# Patient Record
Sex: FEMALE | Race: WHITE | Employment: OTHER | URBAN - METROPOLITAN AREA
[De-identification: names, ages, dates, MRNs, and addresses within clinical notes are randomized per-mention and may not be internally consistent; named-entity substitution may affect disease eponyms.]

---

## 2018-11-07 RX ORDER — ASPIRIN 81 MG/1
81 TABLET ORAL DAILY
COMMUNITY

## 2018-11-07 RX ORDER — ACETAMINOPHEN 500 MG
500 TABLET ORAL EVERY 6 HOURS PRN
COMMUNITY

## 2018-11-07 RX ORDER — POTASSIUM CHLORIDE 20 MEQ/1
20 TABLET, EXTENDED RELEASE ORAL 2 TIMES DAILY
COMMUNITY
End: 2021-01-18

## 2018-11-07 RX ORDER — ACYCLOVIR 800 MG/1
800 TABLET ORAL 2 TIMES DAILY
COMMUNITY

## 2018-11-07 RX ORDER — TRAMADOL HYDROCHLORIDE 50 MG/1
50 TABLET ORAL EVERY 6 HOURS PRN
COMMUNITY

## 2018-11-07 RX ORDER — FAMOTIDINE 40 MG/1
40 TABLET, FILM COATED ORAL DAILY
COMMUNITY
End: 2021-01-18

## 2018-11-07 RX ORDER — POLYETHYLENE GLYCOL 3350 17 G/17G
17 POWDER, FOR SOLUTION ORAL DAILY PRN
COMMUNITY

## 2018-11-07 RX ORDER — CYCLOBENZAPRINE HCL 5 MG
5 TABLET ORAL 3 TIMES DAILY PRN
COMMUNITY

## 2018-11-07 RX ORDER — LORAZEPAM 1 MG/1
1 TABLET ORAL EVERY 6 HOURS PRN
COMMUNITY

## 2018-11-07 RX ORDER — IRBESARTAN 75 MG/1
75 TABLET ORAL DAILY
COMMUNITY
End: 2021-01-18

## 2018-11-07 RX ORDER — FLUTICASONE PROPIONATE 50 MCG
1 SPRAY, SUSPENSION (ML) NASAL
COMMUNITY

## 2018-11-07 RX ORDER — CETIRIZINE HYDROCHLORIDE 10 MG/1
10 TABLET ORAL DAILY
COMMUNITY

## 2018-11-12 ENCOUNTER — ANESTHESIA EVENT (OUTPATIENT)
Dept: SURGERY | Facility: HOSPITAL | Age: 63
End: 2018-11-12

## 2018-11-12 ENCOUNTER — ANESTHESIA (OUTPATIENT)
Dept: SURGERY | Facility: HOSPITAL | Age: 63
End: 2018-11-12

## 2018-11-12 ENCOUNTER — HOSPITAL ENCOUNTER (OUTPATIENT)
Facility: HOSPITAL | Age: 63
Setting detail: HOSPITAL OUTPATIENT SURGERY
Discharge: HOME | End: 2018-11-12
Attending: OPHTHALMOLOGY | Admitting: OPHTHALMOLOGY
Payer: MEDICARE

## 2018-11-12 VITALS
OXYGEN SATURATION: 95 % | HEIGHT: 61 IN | RESPIRATION RATE: 15 BRPM | DIASTOLIC BLOOD PRESSURE: 67 MMHG | HEART RATE: 81 BPM | SYSTOLIC BLOOD PRESSURE: 118 MMHG | TEMPERATURE: 98 F | BODY MASS INDEX: 45.84 KG/M2 | WEIGHT: 242.81 LBS

## 2018-11-12 PROCEDURE — 08B43ZZ EXCISION OF RIGHT VITREOUS, PERCUTANEOUS APPROACH: ICD-10-PCS | Performed by: OPHTHALMOLOGY

## 2018-11-12 RX ORDER — HYDROCODONE BITARTRATE AND ACETAMINOPHEN 5; 325 MG/1; MG/1
2 TABLET ORAL AS NEEDED
Status: DISCONTINUED | OUTPATIENT
Start: 2018-11-12 | End: 2018-11-12

## 2018-11-12 RX ORDER — MORPHINE SULFATE 10 MG/ML
6 INJECTION, SOLUTION INTRAMUSCULAR; INTRAVENOUS EVERY 10 MIN PRN
Status: DISCONTINUED | OUTPATIENT
Start: 2018-11-12 | End: 2018-11-12

## 2018-11-12 RX ORDER — SODIUM CHLORIDE, SODIUM LACTATE, POTASSIUM CHLORIDE, CALCIUM CHLORIDE 600; 310; 30; 20 MG/100ML; MG/100ML; MG/100ML; MG/100ML
INJECTION, SOLUTION INTRAVENOUS CONTINUOUS
Status: DISCONTINUED | OUTPATIENT
Start: 2018-11-12 | End: 2018-11-12

## 2018-11-12 RX ORDER — METOCLOPRAMIDE 10 MG/1
10 TABLET ORAL ONCE
Status: COMPLETED | OUTPATIENT
Start: 2018-11-12 | End: 2018-11-12

## 2018-11-12 RX ORDER — NALOXONE HYDROCHLORIDE 0.4 MG/ML
80 INJECTION, SOLUTION INTRAMUSCULAR; INTRAVENOUS; SUBCUTANEOUS AS NEEDED
Status: DISCONTINUED | OUTPATIENT
Start: 2018-11-12 | End: 2018-11-12

## 2018-11-12 RX ORDER — TRIAMCINOLONE ACETONIDE 40 MG/ML
INJECTION, SUSPENSION INTRA-ARTICULAR; INTRAMUSCULAR AS NEEDED
Status: DISCONTINUED | OUTPATIENT
Start: 2018-11-12 | End: 2018-11-12 | Stop reason: HOSPADM

## 2018-11-12 RX ORDER — KETOROLAC TROMETHAMINE 5 MG/ML
1 SOLUTION OPHTHALMIC SEE ADMIN INSTRUCTIONS
Status: COMPLETED | OUTPATIENT
Start: 2018-11-12 | End: 2018-11-12

## 2018-11-12 RX ORDER — HOMATROPINE HYDROBROMIDE OPHTHALMIC 50 MG/ML
2 SOLUTION OPHTHALMIC SEE ADMIN INSTRUCTIONS
Status: COMPLETED | OUTPATIENT
Start: 2018-11-12 | End: 2018-11-12

## 2018-11-12 RX ORDER — KETAMINE HYDROCHLORIDE 50 MG/ML
INJECTION, SOLUTION, CONCENTRATE INTRAMUSCULAR; INTRAVENOUS AS NEEDED
Status: DISCONTINUED | OUTPATIENT
Start: 2018-11-12 | End: 2018-11-12 | Stop reason: SURG

## 2018-11-12 RX ORDER — MORPHINE SULFATE 4 MG/ML
2 INJECTION, SOLUTION INTRAMUSCULAR; INTRAVENOUS EVERY 10 MIN PRN
Status: DISCONTINUED | OUTPATIENT
Start: 2018-11-12 | End: 2018-11-12

## 2018-11-12 RX ORDER — FAMOTIDINE 20 MG/1
20 TABLET ORAL ONCE
Status: DISCONTINUED | OUTPATIENT
Start: 2018-11-12 | End: 2018-11-12 | Stop reason: HOSPADM

## 2018-11-12 RX ORDER — MIDAZOLAM HYDROCHLORIDE 1 MG/ML
INJECTION INTRAMUSCULAR; INTRAVENOUS AS NEEDED
Status: DISCONTINUED | OUTPATIENT
Start: 2018-11-12 | End: 2018-11-12 | Stop reason: SURG

## 2018-11-12 RX ORDER — BALANCED SALT SOLUTION 6.4; .75; .48; .3; 3.9; 1.7 MG/ML; MG/ML; MG/ML; MG/ML; MG/ML; MG/ML
SOLUTION OPHTHALMIC AS NEEDED
Status: DISCONTINUED | OUTPATIENT
Start: 2018-11-12 | End: 2018-11-12 | Stop reason: HOSPADM

## 2018-11-12 RX ORDER — MORPHINE SULFATE 4 MG/ML
4 INJECTION, SOLUTION INTRAMUSCULAR; INTRAVENOUS EVERY 10 MIN PRN
Status: DISCONTINUED | OUTPATIENT
Start: 2018-11-12 | End: 2018-11-12

## 2018-11-12 RX ORDER — TROPICAMIDE 10 MG/ML
2 SOLUTION/ DROPS OPHTHALMIC SEE ADMIN INSTRUCTIONS
Status: COMPLETED | OUTPATIENT
Start: 2018-11-12 | End: 2018-11-12

## 2018-11-12 RX ORDER — ONDANSETRON 2 MG/ML
4 INJECTION INTRAMUSCULAR; INTRAVENOUS ONCE AS NEEDED
Status: DISCONTINUED | OUTPATIENT
Start: 2018-11-12 | End: 2018-11-12

## 2018-11-12 RX ORDER — HYDROCODONE BITARTRATE AND ACETAMINOPHEN 5; 325 MG/1; MG/1
1 TABLET ORAL AS NEEDED
Status: DISCONTINUED | OUTPATIENT
Start: 2018-11-12 | End: 2018-11-12

## 2018-11-12 RX ORDER — CIPROFLOXACIN HYDROCHLORIDE 3.5 MG/ML
SOLUTION/ DROPS TOPICAL AS NEEDED
Status: DISCONTINUED | OUTPATIENT
Start: 2018-11-12 | End: 2018-11-12 | Stop reason: HOSPADM

## 2018-11-12 RX ORDER — DEXAMETHASONE SODIUM PHOSPHATE 4 MG/ML
VIAL (ML) INJECTION AS NEEDED
Status: DISCONTINUED | OUTPATIENT
Start: 2018-11-12 | End: 2018-11-12 | Stop reason: HOSPADM

## 2018-11-12 RX ORDER — PHENYLEPHRINE HCL 2.5 %
2 DROPS OPHTHALMIC (EYE) SEE ADMIN INSTRUCTIONS
Status: COMPLETED | OUTPATIENT
Start: 2018-11-12 | End: 2018-11-12

## 2018-11-12 RX ORDER — ACETAMINOPHEN 500 MG
1000 TABLET ORAL ONCE
Status: COMPLETED | OUTPATIENT
Start: 2018-11-12 | End: 2018-11-12

## 2018-11-12 RX ADMIN — KETAMINE HYDROCHLORIDE 20 MG: 50 INJECTION, SOLUTION, CONCENTRATE INTRAMUSCULAR; INTRAVENOUS at 08:46:00

## 2018-11-12 RX ADMIN — SODIUM CHLORIDE, SODIUM LACTATE, POTASSIUM CHLORIDE, CALCIUM CHLORIDE: 600; 310; 30; 20 INJECTION, SOLUTION INTRAVENOUS at 09:07:00

## 2018-11-12 RX ADMIN — MIDAZOLAM HYDROCHLORIDE 2 MG: 1 INJECTION INTRAMUSCULAR; INTRAVENOUS at 08:46:00

## 2018-11-12 RX ADMIN — SODIUM CHLORIDE, SODIUM LACTATE, POTASSIUM CHLORIDE, CALCIUM CHLORIDE: 600; 310; 30; 20 INJECTION, SOLUTION INTRAVENOUS at 08:42:00

## 2018-11-12 NOTE — ANESTHESIA PREPROCEDURE EVALUATION
Anesthesia PreOp Note    HPI:     Dietrich Boeck is a 66year old female who presents for preoperative consultation requested by:  Willard Morales MD    Date of Surgery: 11/12/2018    Procedure(s):  EYE VITRECTOMY WITH INDIRECT/ DIRECT LASER/ IRIDEX  Ind 11/9/2018 at Unknown time   Pomalidomide (POMALYST) 3 MG Oral Cap Take 1 tablet by mouth daily. Disp:  Rfl:  11/7/2018   sodium chloride 0.9 % SOLN 20 mL with Dexamethasone Sodium Phosphate 120 MG/30ML SOLN 12 mg by IV Push route once a week.  Disp:  Rfl: needs - non-medical: Not on file    Occupational History      Not on file    Tobacco Use      Smoking status: Never Smoker      Smokeless tobacco: Never Used    Substance and Sexual Activity      Alcohol use: Yes        Comment: RARE      Drug use:  No desires the anesthetic management as planned.   JAYLON PETERSON  11/12/2018 8:33 AM

## 2018-11-12 NOTE — ANESTHESIA POSTPROCEDURE EVALUATION
Patient: Vonnie Pellet    Procedure Summary     Date:  11/12/18 Room / Location:  [address] [place] [address] MAIN OR 03 / [address] [place] [address] MAIN OR    Anesthesia Start:  5780 Anesthesia Stop:      Procedure:  EYE VITRECTOMY WITH INDIRECT/ DIRECT LASER/ IRIDEX (Right Eye) Diagnosis:  (v

## 2018-11-12 NOTE — OPERATIVE REPORT
[facility]    PATIENT'S NAME: Jordan Winston   ATTENDING PHYSICIAN: Jackie Velez MD   OPERATING PHYSICIAN: Jackie Velez MD   PATIENT ACCOUNT#:   [de-identified]    LOCATION:  [facility] PACU [address] [place] [address]  MEDICAL RECORD #:   [id number]       DATE OF BI the infusion line. Subconjunctival ciprofloxacin and dexamethasone were then administered. Speculum and drapes were removed. Operative area was cleaned and dressed with a patch.   Patient was transferred to the PACU in stable condition having tolerated t

## 2018-11-12 NOTE — H&P
History & Physical Examination    Name: Daisha Mars  Patient ID:  [id number]  Date:  11/12/2018  YOB: 1955 AGE:  66year old SEX:  female      M.D./D.O./D.P.M PERFORMING SURGERY/PROCEDURE:    Eliel Kaiser MD    Diagnosis:  Vitreous

## 2018-11-12 NOTE — BRIEF OP NOTE
Pre-Operative Diagnosis: vitreous membranes and strands right eye     Post-Operative Diagnosis:same     Procedure Performed:     pars plana vitrectomy right eye    Surgeon(s) and Role:     Tim Burrell MD - Primary    Anesthesia : RBB     Complications:

## 2020-12-14 ENCOUNTER — OFFICE VISIT (OUTPATIENT)
Dept: PODIATRY CLINIC | Facility: CLINIC | Age: 65
End: 2020-12-14
Payer: MEDICARE

## 2020-12-14 DIAGNOSIS — M72.2 PLANTAR FASCIITIS: Primary | ICD-10-CM

## 2020-12-14 DIAGNOSIS — M19.071 PRIMARY OSTEOARTHRITIS OF BOTH FEET: ICD-10-CM

## 2020-12-14 DIAGNOSIS — M19.072 PRIMARY OSTEOARTHRITIS OF BOTH FEET: ICD-10-CM

## 2020-12-14 PROCEDURE — 99203 OFFICE O/P NEW LOW 30 MIN: CPT | Performed by: PODIATRIST

## 2020-12-14 PROCEDURE — L3000 FT INSERT UCB BERKELEY SHELL: HCPCS | Performed by: PODIATRIST

## 2020-12-14 PROCEDURE — 29799 UNLISTED PX CASTING/STRPG: CPT | Performed by: PODIATRIST

## 2020-12-14 NOTE — PROGRESS NOTES
Markell Fernandez is a 63year old female. Patient presents with:  New Patient: Patient has had orthotics since she was 27years old - current orthotics are not providing enough support - ankle pain and knee instability.         HPI:     Is here for evalu ([facility] [address]     MULTIPLE MYELOMA   • Cataract    • High blood pressure    • IBS (irritable bowel syndrome)    • Osteoarthritis    • Sleep apnea     USES CPAP   • Visual impairment     WEARS GLASSES      Past Surgical History:   Procedure Laterality Date   • CHOL normal.  She is got significant collapse of the foot with tenderness upon palpation along the plantar fascia she is got midfoot arthritis noted secondary to spurring.         ASSESSMENT AND PLAN:   Diagnoses and all orders for this visit:    Plantar fasciit

## 2021-01-13 ENCOUNTER — TELEPHONE (OUTPATIENT)
Dept: PODIATRY CLINIC | Facility: CLINIC | Age: 66
End: 2021-01-13

## 2021-01-18 ENCOUNTER — OFFICE VISIT (OUTPATIENT)
Dept: PODIATRY CLINIC | Facility: CLINIC | Age: 66
End: 2021-01-18
Payer: MEDICARE

## 2021-01-18 DIAGNOSIS — M72.2 PLANTAR FASCIITIS: Primary | ICD-10-CM

## 2021-01-18 DIAGNOSIS — M19.071 PRIMARY OSTEOARTHRITIS OF BOTH FEET: ICD-10-CM

## 2021-01-18 DIAGNOSIS — M19.072 PRIMARY OSTEOARTHRITIS OF BOTH FEET: ICD-10-CM

## 2021-01-18 RX ORDER — POTASSIUM CHLORIDE 1500 MG/1
TABLET, FILM COATED, EXTENDED RELEASE ORAL
COMMUNITY
Start: 2021-01-10

## 2021-01-18 RX ORDER — OMEPRAZOLE 20 MG/1
CAPSULE, DELAYED RELEASE ORAL
COMMUNITY
Start: 2020-09-22 | End: 2021-01-18

## 2021-01-18 RX ORDER — METFORMIN HYDROCHLORIDE 500 MG/1
500 TABLET, EXTENDED RELEASE ORAL
COMMUNITY
Start: 2020-08-26 | End: 2021-03-11

## 2021-01-18 RX ORDER — HYDROCORTISONE 25 MG/ML
LOTION TOPICAL
COMMUNITY
Start: 2018-06-27

## 2021-01-18 RX ORDER — BLOOD SUGAR DIAGNOSTIC
STRIP MISCELLANEOUS DAILY
COMMUNITY
Start: 2018-06-11

## 2021-01-18 RX ORDER — ZOLEDRONIC ACID 4 MG
4 KIT INTRAVENOUS
COMMUNITY
Start: 2019-01-09

## 2021-01-18 RX ORDER — MAGNESIUM OXIDE 400 MG (241.3 MG MAGNESIUM) TABLET
400 TABLET DAILY
COMMUNITY
Start: 2020-05-22 | End: 2021-01-18

## 2021-01-18 RX ORDER — DIPHENHYD/PHENYLEPH/ACETAMINOP 12.5-5-325
TABLET ORAL
COMMUNITY
Start: 2019-02-01

## 2021-01-18 NOTE — PROGRESS NOTES
Rio Bolanos is a 63year old female. Patient presents with:  Orthotic Status: here to  orthotics.         HPI:     She is here for dispensing of orthotics    Allergies: Pravastatin, Dapsone, Mastisol Adhesive, Atovaquone, and Bactrim [Sulfam • Back problem    • Cancer (HCC)     MULTIPLE MYELOMA   • Cataract    • High blood pressure    • IBS (irritable bowel syndrome)    • Osteoarthritis    • Sleep apnea     USES CPAP   • Visual impairment     WEARS GLASSES      Past Surgical History:   Proce Normal sharp dull sensation; reflexes normal.  Orthotics appear to fit properly        ASSESSMENT AND PLAN:   Diagnoses and all orders for this visit:    Plantar fasciitis    Primary osteoarthritis of both feet        Plan:  We watched her walk with the ort

## 2021-01-21 ENCOUNTER — OFFICE VISIT (OUTPATIENT)
Dept: PODIATRY CLINIC | Facility: CLINIC | Age: 66
End: 2021-01-21
Payer: MEDICARE

## 2021-01-21 DIAGNOSIS — M19.071 PRIMARY OSTEOARTHRITIS OF BOTH FEET: Primary | ICD-10-CM

## 2021-01-21 DIAGNOSIS — M19.072 PRIMARY OSTEOARTHRITIS OF BOTH FEET: Primary | ICD-10-CM

## 2021-01-21 DIAGNOSIS — M72.2 PLANTAR FASCIITIS: ICD-10-CM

## 2021-01-21 PROCEDURE — 99213 OFFICE O/P EST LOW 20 MIN: CPT | Performed by: PODIATRIST

## 2021-01-21 NOTE — PROGRESS NOTES
Ariadne Dougherty is a 63year old female. Patient presents with: Follow - Up: Orthotics not fitting right.         HPI:     Patient presents today states that she is getting some soreness in the foot on the top of the foot right side after wearing the EC Take 81 mg by mouth daily. • Cyclobenzaprine HCl 5 MG Oral Tab Take 5 mg by mouth 3 (three) times daily as needed for Muscle spasms. • MAGNESIUM OXIDE OR Take by mouth.         Past Medical History:   Diagnosis Date   • Back problem    • Cancer ( Vascular: Dorsalis pedis two out of four bilateral and posterior tibial pulses two out of   four bilateral, capillary refill normal.   3. Musculoskeletal: All muscle groups are graded 5 out of 5 in the foot and ankle.    4. Neurological: Normal sharp dull s

## 2021-03-11 ENCOUNTER — OFFICE VISIT (OUTPATIENT)
Dept: PODIATRY CLINIC | Facility: CLINIC | Age: 66
End: 2021-03-11
Payer: MEDICARE

## 2021-03-11 DIAGNOSIS — L84 CALLUS OF FOOT: Primary | ICD-10-CM

## 2021-03-14 NOTE — PROGRESS NOTES
Cristina Deleon is a 63year old female. Patient presents with: Follow - Up: did not check BS this AM - per patient develop callus on right foot due to new orthotics - pain scale at worst 8/10.         HPI:   Turns to clinic complaining of painful ort MG Oral Tab Take 5 mg by mouth 3 (three) times daily as needed for Muscle spasms. • MAGNESIUM OXIDE OR Take by mouth.         Past Medical History:   Diagnosis Date   • Back problem    • Cancer ([address]     MULTIPLE MYELOMA   • Cataract    • High blood pre Services:       Active Member of Clubs or Organizations:       Attends Club or Organization Meetings:       Marital Status:   Intimate Partner Violence:       Fear of Current or Ex-Partner:       Emotionally Abused:       Physically Abused:       Sexually

## 2021-03-24 ENCOUNTER — TELEPHONE (OUTPATIENT)
Dept: PODIATRY CLINIC | Facility: CLINIC | Age: 66
End: 2021-03-24

## 2021-03-24 NOTE — TELEPHONE ENCOUNTER
Please inform the patient that there would be an upper charge for that she may not want them thank you

## 2021-03-24 NOTE — TELEPHONE ENCOUNTER
Called [facility] -- spoke to Neil. He states that Christopher wanted orthotics to have navicular accomodation. This would mean that they would need to create a new orthotic. They can do this at a 35 % discount which would cost $109.59 without shipping and with shipping around $125.00. Informed Neil that I will let Dr. Martín Francisco know of this and we will need to discuss with pt as well and then call [facility] know of their decision. Neil to make a note of this stating they are waiting on our offices response. -- Dr Martín Francisco please advise -- Do you still want to proceed with adjusting these orthotics?    -- Tasking to Cass

## 2021-03-25 NOTE — TELEPHONE ENCOUNTER
Spoke to patient. She is very upset that there is an extra fee and refuses to pay as she states the origianl orthotics were the wrong fit. I told patient I will need to speak to someone in my department about the situation with the original orthotic and the extra fee for a new one as I did not have background information on this. Patient was able to come down towards the end of the conversation and was agreeable to me returning her call.

## 2021-04-01 NOTE — TELEPHONE ENCOUNTER
[facility] called again reporting navicular accommodation remains at 35% discount and asking how to proceed. Advised them to put the order on hold as the patient does not want to pay additional fees for orthotics. Routed to  staff per documentation below.

## 2021-04-06 NOTE — TELEPHONE ENCOUNTER
Patient calling requesting her used orthotic is returned. Still waiting on approval for extra charge wave. I will contact the patient as soon as I get confirmation. I also contacted [facility] explained that patient wants her old orthotic back and I will call them as soon as I hear on charges so they can build the new orthotic or return the used.

## 2021-04-14 NOTE — TELEPHONE ENCOUNTER
I received an authorization to make new orthotics at 35% discount at no charge to the patient. Clinic will the charge for the new orthotics. I called the patient and I notified her of this and she was happy to have new orthotics made as she had previously requested the old ones back. I will call patient when the new orthotics come in. She also has new X-Rays of her ankle so I advised that she make an appt with Dr Guanako Douglass later. I spoke to [facility] at [facility]. They will start work on the new orthotics.

## 2021-04-15 NOTE — TELEPHONE ENCOUNTER
I spoke to [facility] at [facility] this morning. The clinic will be covering the cost for the new orthotics at 35% discount.  I asked Jennifer to bill the clinic and she explained she will contact me once the order is created to give me a total.

## 2021-05-07 ENCOUNTER — OFFICE VISIT (OUTPATIENT)
Dept: PODIATRY CLINIC | Facility: CLINIC | Age: 66
End: 2021-05-07
Payer: MEDICARE

## 2021-05-07 DIAGNOSIS — M19.072 PRIMARY OSTEOARTHRITIS OF BOTH FEET: ICD-10-CM

## 2021-05-07 DIAGNOSIS — L84 CALLUS OF FOOT: ICD-10-CM

## 2021-05-07 DIAGNOSIS — M19.071 PRIMARY OSTEOARTHRITIS OF BOTH FEET: ICD-10-CM

## 2021-05-07 DIAGNOSIS — M72.2 PLANTAR FASCIITIS: Primary | ICD-10-CM

## 2021-05-11 NOTE — PROGRESS NOTES
Chelle Palumbo is a 63year old female. Patient presents with:  Orthotic Status: Patient is here to  her orthotics. Patient does see another podiatrist, she was diagnosed with a stress fracture on the right foot.  Patient is wearing a surgical route once a week. • aspirin 81 MG Oral Tab EC Take 81 mg by mouth daily. • Cyclobenzaprine HCl 5 MG Oral Tab Take 5 mg by mouth 3 (three) times daily as needed for Muscle spasms. • MAGNESIUM OXIDE OR Take by mouth.         Past Medical History: foot    Primary osteoarthritis of both feet        Plan: Patient will wear as tolerated and break-in gradually follow-up as needed or if they bother her. The patient indicates understanding of these issues and agrees to the plan.     VAISHNAVI Luis

## 2024-06-18 ENCOUNTER — TELEPHONE (OUTPATIENT)
Dept: NEUROLOGY | Facility: CLINIC | Age: 69
End: 2024-06-18

## 2024-06-18 NOTE — TELEPHONE ENCOUNTER
EMG report from [facility] placed in Dr Lerma's folder for review. Report is also in Care Everywhere. Patient has seen Dr Lerma at [facility] but [facility] neurology. Dr Lerma reviewed and reprort discarded.

## 2024-12-30 ENCOUNTER — HOSPITAL ENCOUNTER (INPATIENT)
Facility: HOSPITAL | Age: 69
LOS: 7 days | Discharge: NURSING, REHAB OR LONG-TERM CARE | DRG: 481 | End: 2025-01-06
Attending: EMERGENCY MEDICINE | Admitting: HOSPITALIST
Payer: MEDICARE

## 2024-12-30 ENCOUNTER — APPOINTMENT (OUTPATIENT)
Dept: GENERAL RADIOLOGY | Facility: HOSPITAL | Age: 69
DRG: 481 | End: 2024-12-30
Attending: EMERGENCY MEDICINE
Payer: MEDICARE

## 2024-12-30 DIAGNOSIS — S72.8X2A OTHER CLOSED FRACTURE OF LEFT FEMUR, UNSPECIFIED PORTION OF FEMUR, INITIAL ENCOUNTER (HCC): Primary | ICD-10-CM

## 2024-12-30 PROBLEM — S72.009A: Status: ACTIVE | Noted: 2024-12-30

## 2024-12-30 LAB
ANION GAP SERPL CALC-SCNC: 10 MMOL/L (ref 0–18)
ANTIBODY SCREEN: POSITIVE
BASOPHILS # BLD AUTO: 0.05 X10(3) UL (ref 0–0.2)
BASOPHILS NFR BLD AUTO: 0.9 %
BUN BLD-MCNC: 12 MG/DL (ref 9–23)
BUN/CREAT SERPL: 15.8 (ref 10–20)
CALCIUM BLD-MCNC: 9.8 MG/DL (ref 8.7–10.4)
CHLORIDE SERPL-SCNC: 106 MMOL/L (ref 98–112)
CO2 SERPL-SCNC: 24 MMOL/L (ref 21–32)
CREAT BLD-MCNC: 0.76 MG/DL
DEPRECATED RDW RBC AUTO: 44 FL (ref 35.1–46.3)
DIRECT COOMBS POLY: NEGATIVE
EGFRCR SERPLBLD CKD-EPI 2021: 86 ML/MIN/1.73M2 (ref 60–?)
EOSINOPHIL # BLD AUTO: 0.12 X10(3) UL (ref 0–0.7)
EOSINOPHIL NFR BLD AUTO: 2.2 %
ERYTHROCYTE [DISTWIDTH] IN BLOOD BY AUTOMATED COUNT: 13.5 % (ref 11–15)
GLUCOSE BLD-MCNC: 119 MG/DL (ref 70–99)
HCT VFR BLD AUTO: 43.2 %
HGB BLD-MCNC: 14.7 G/DL
IMM GRANULOCYTES # BLD AUTO: 0.03 X10(3) UL (ref 0–1)
IMM GRANULOCYTES NFR BLD: 0.5 %
K ANTIGEN: NEGATIVE
LYMPHOCYTES # BLD AUTO: 2.69 X10(3) UL (ref 1–4)
LYMPHOCYTES NFR BLD AUTO: 48.5 %
MCH RBC QN AUTO: 30.4 PG (ref 26–34)
MCHC RBC AUTO-ENTMCNC: 34 G/DL (ref 31–37)
MCV RBC AUTO: 89.4 FL
MONOCYTES # BLD AUTO: 0.4 X10(3) UL (ref 0.1–1)
MONOCYTES NFR BLD AUTO: 7.2 %
MRSA DNA SPEC QL NAA+PROBE: NEGATIVE
NEUTROPHILS # BLD AUTO: 2.26 X10 (3) UL (ref 1.5–7.7)
NEUTROPHILS # BLD AUTO: 2.26 X10(3) UL (ref 1.5–7.7)
NEUTROPHILS NFR BLD AUTO: 40.7 %
OSMOLALITY SERPL CALC.SUM OF ELEC: 291 MOSM/KG (ref 275–295)
PLATELET # BLD AUTO: 149 10(3)UL (ref 150–450)
POTASSIUM SERPL-SCNC: 3.6 MMOL/L (ref 3.5–5.1)
RBC # BLD AUTO: 4.83 X10(6)UL
RH BLOOD TYPE: POSITIVE
RH BLOOD TYPE: POSITIVE
SODIUM SERPL-SCNC: 140 MMOL/L (ref 136–145)
WBC # BLD AUTO: 5.6 X10(3) UL (ref 4–11)

## 2024-12-30 PROCEDURE — 73502 X-RAY EXAM HIP UNI 2-3 VIEWS: CPT | Performed by: EMERGENCY MEDICINE

## 2024-12-30 PROCEDURE — 99223 1ST HOSP IP/OBS HIGH 75: CPT | Performed by: HOSPITALIST

## 2024-12-30 RX ORDER — METOCLOPRAMIDE HYDROCHLORIDE 5 MG/ML
5 INJECTION INTRAMUSCULAR; INTRAVENOUS EVERY 8 HOURS PRN
Status: DISCONTINUED | OUTPATIENT
Start: 2024-12-30 | End: 2024-12-31

## 2024-12-30 RX ORDER — MORPHINE SULFATE 2 MG/ML
1 INJECTION, SOLUTION INTRAMUSCULAR; INTRAVENOUS EVERY 2 HOUR PRN
Status: DISCONTINUED | OUTPATIENT
Start: 2024-12-30 | End: 2024-12-31

## 2024-12-30 RX ORDER — HYDRALAZINE HYDROCHLORIDE 20 MG/ML
10 INJECTION INTRAMUSCULAR; INTRAVENOUS EVERY 4 HOURS PRN
Status: DISCONTINUED | OUTPATIENT
Start: 2024-12-30 | End: 2025-01-06

## 2024-12-30 RX ORDER — MORPHINE SULFATE 2 MG/ML
2 INJECTION, SOLUTION INTRAMUSCULAR; INTRAVENOUS EVERY 2 HOUR PRN
Status: DISCONTINUED | OUTPATIENT
Start: 2024-12-30 | End: 2024-12-31

## 2024-12-30 RX ORDER — CALCIUM ACETATE 667 MG/1
1 CAPSULE ORAL 2 TIMES DAILY
COMMUNITY

## 2024-12-30 RX ORDER — ONDANSETRON 2 MG/ML
4 INJECTION INTRAMUSCULAR; INTRAVENOUS EVERY 6 HOURS PRN
Status: DISCONTINUED | OUTPATIENT
Start: 2024-12-30 | End: 2024-12-30

## 2024-12-30 RX ORDER — ONDANSETRON 2 MG/ML
4 INJECTION INTRAMUSCULAR; INTRAVENOUS EVERY 4 HOURS PRN
Status: DISCONTINUED | OUTPATIENT
Start: 2024-12-30 | End: 2024-12-31

## 2024-12-30 RX ORDER — MORPHINE SULFATE 4 MG/ML
8 INJECTION, SOLUTION INTRAMUSCULAR; INTRAVENOUS ONCE
Status: COMPLETED | OUTPATIENT
Start: 2024-12-30 | End: 2024-12-30

## 2024-12-30 RX ORDER — CYCLOBENZAPRINE HCL 5 MG
5 TABLET ORAL 3 TIMES DAILY PRN
Status: DISCONTINUED | OUTPATIENT
Start: 2024-12-30 | End: 2025-01-06

## 2024-12-30 RX ORDER — CEFAZOLIN SODIUM IN 0.9 % NACL 3 G/100 ML
3 INTRAVENOUS SOLUTION, PIGGYBACK (ML) INTRAVENOUS
Status: COMPLETED | OUTPATIENT
Start: 2024-12-31 | End: 2024-12-31

## 2024-12-30 RX ORDER — ENOXAPARIN SODIUM 100 MG/ML
40 INJECTION SUBCUTANEOUS ONCE
Status: COMPLETED | OUTPATIENT
Start: 2024-12-30 | End: 2024-12-30

## 2024-12-30 RX ORDER — MORPHINE SULFATE 4 MG/ML
4 INJECTION, SOLUTION INTRAMUSCULAR; INTRAVENOUS ONCE
Status: COMPLETED | OUTPATIENT
Start: 2024-12-30 | End: 2024-12-30

## 2024-12-30 RX ORDER — ACETAMINOPHEN 500 MG
500 TABLET ORAL EVERY 4 HOURS PRN
Status: DISCONTINUED | OUTPATIENT
Start: 2024-12-30 | End: 2025-01-06

## 2024-12-30 RX ORDER — ASPIRIN 81 MG/1
81 TABLET ORAL DAILY
Status: CANCELLED | OUTPATIENT
Start: 2024-12-30

## 2024-12-30 RX ORDER — CETIRIZINE HYDROCHLORIDE 10 MG/1
10 TABLET ORAL DAILY
Status: DISCONTINUED | OUTPATIENT
Start: 2024-12-30 | End: 2025-01-06

## 2024-12-30 RX ORDER — TEMAZEPAM 15 MG/1
15 CAPSULE ORAL NIGHTLY PRN
Status: DISCONTINUED | OUTPATIENT
Start: 2024-12-30 | End: 2025-01-06

## 2024-12-30 RX ORDER — MORPHINE SULFATE 4 MG/ML
INJECTION, SOLUTION INTRAMUSCULAR; INTRAVENOUS
Status: COMPLETED
Start: 2024-12-30 | End: 2024-12-30

## 2024-12-30 RX ORDER — CALCIUM ACETATE 667 MG/1
1 CAPSULE ORAL 2 TIMES DAILY
Status: DISCONTINUED | OUTPATIENT
Start: 2024-12-30 | End: 2025-01-06

## 2024-12-30 RX ORDER — GABAPENTIN 300 MG/1
1 CAPSULE ORAL EVERY EVENING
COMMUNITY
Start: 2024-12-04

## 2024-12-30 RX ORDER — MORPHINE SULFATE 4 MG/ML
4 INJECTION, SOLUTION INTRAMUSCULAR; INTRAVENOUS EVERY 2 HOUR PRN
Status: DISCONTINUED | OUTPATIENT
Start: 2024-12-30 | End: 2024-12-31

## 2024-12-30 NOTE — H&P
[facility]    PATIENT'S NAME: GERI AGUIRRE   ATTENDING PHYSICIAN: Jose J Bailey MD   PATIENT ACCOUNT#:   [id number]    LOCATION:  [address]  MEDICAL RECORD #:   [id number]       YOB: 1955  ADMISSION DATE:       12/30/2024    HISTORY AND PHYSICAL EXAMINATION    CHIEF COMPLAINT:  Left comminuted displaced proximal femur fracture.    HISTORY OF PRESENT ILLNESS:  Patient is a 69-year-old  female who tripped on a carpet and fell, landing on her left hip, with excruciating pain.  Brought into the emergency department for evaluation.  CBC and chemistry were unremarkable.  X-ray of the hip showed acute foreshortened and displaced fracture involving the proximal left femur shaft.  EKG still pending.  Patient will be admitted to the hospital for further management.     PAST MEDICAL HISTORY:  Essential hypertension, morbid obesity, obstructive sleep apnea, generalized osteoarthritis degenerative joint disease of lumbar spine, irritable bowel syndrome.  Multiple myeloma, status post autologous stem cell transplant, currently on monthly injections of daratumumab.      PAST SURGICAL HISTORY:  Cholecystectomy, cataract procedure, hysterectomy, right total knee arthroplasty, bladder lift procedure, lumbar laminectomy, and tonsillectomy.    MEDICATIONS:  Please see medication reconciliation list.     ALLERGIES:  Bactrim.  Side effects to statins.    FAMILY HISTORY:  Mother had COPD.  Father had heart disease.      SOCIAL HISTORY:  No tobacco, alcohol, or drug use.  Independent for basic activities of daily living.     REVIEW OF SYSTEMS:  Patient described the fall as mechanical, landing with full force and her entire weight on her left hip.  She heard a pop and she could not bear weight.  Denies any chest pain or shortness of breath.  No dizziness.  No syncope.  Other 12-point review of systems is negative.       PHYSICAL EXAMINATION:    GENERAL:  Alert and oriented to time,  place and person.  Moderate distress.   VITAL SIGNS:  Temperature 97.6, pulse 58, respiratory rate 15, blood pressure 170/74, pulse ox 92% on room air.   HEENT:  Atraumatic.  Oropharynx clear.  Moist mucous membranes.  Ears and nose normal.  Eyes:  Anicteric sclerae.   NECK:  Supple.  No lymphadenopathy.  Trachea midline.  Full range of motion.   LUNGS:  Clear to auscultation bilaterally.  Normal respiratory effort.    HEART:  Regular rate and rhythm.  S1 and S2 auscultated.  No murmur.    ABDOMEN:  Soft, obese.  Positive bowel sounds.   EXTREMITIES:  Left lower extremity shortened and externally rotated compared to the right.  Dorsalis pedis pulses palpated bilaterally.  NEUROLOGIC:  Motor and sensory intact.    ASSESSMENT:    1.   Acute displaced left proximal femur shaft fracture after a mechanical fall.  2.   Morbid obesity.  3.   Obstructive sleep apnea.  4.   Essential hypertension.  5.   Multiple myeloma, status post autologous stem cell transplant, currently in remission.     PLAN:  Patient will be admitted to general medical floor.  Pain control.  DVT prophylaxis.  Monitor hemodynamic status.  High risk for fat embolism.  Obtain orthopedic surgery consult.  Obtain 12-lead EKG.  If unremarkable, patient is cleared for surgical intervention.  Further recommendations to follow.     Dictated By Yoana Pierson MD  d: 12/30/2024 12:16:32  t: 12/30/2024 12:29:18  [facility] [id number]  FB/

## 2024-12-30 NOTE — CONSULTS
[facility]  Report of Consultation    Jackelin Mackey Patient Status:  Inpatient    1955 MRN [id number]   Location [facility] 4W/SW/SE Attending Yoana Pierson MD   Hosp Day # 0 PCP Colby Pandya MD, MD     Reason for Consultation:  Left femur fracture    History of Present Illness:  Jackelin Mackey is a a(n) 69 year old female s/p fall with left subtroch femur fracture.      History:  Past Medical History:    Back problem    Cancer (HCC)    MULTIPLE MYELOMA    Cataract    High blood pressure    IBS (irritable bowel syndrome)    Osteoarthritis    Sleep apnea    USES CPAP    Visual impairment    WEARS GLASSES     Past Surgical History:   Procedure Laterality Date    Cholecystectomy      Hysterectomy      Other      BLADDER LIFT    Other      LAMINECTOMY,SYNOVIAL CYST REMOVAL    Other  ,    STEM CELL TRANSPLANT    Tonsillectomy       Family History   Problem Relation Age of Onset    Heart Disease Father     Other (COPD) Mother     Diabetes Sister       reports that she has never smoked. She has never used smokeless tobacco. She reports current alcohol use. She reports that she does not use drugs.    Allergies:  Allergies[1]    Medications:    Current Facility-Administered Medications:     acetaminophen (Tylenol Extra Strength) tab 500 mg, 500 mg, Oral, Q4H PRN    ondansetron (Zofran) 4 MG/2ML injection 4 mg, 4 mg, Intravenous, Q6H PRN    metoclopramide (Reglan) 5 mg/mL injection 5 mg, 5 mg, Intravenous, Q8H PRN    temazepam (Restoril) cap 15 mg, 15 mg, Oral, Nightly PRN    morphINE PF 2 MG/ML injection 1 mg, 1 mg, Intravenous, Q2H PRN **OR** morphINE PF 2 MG/ML injection 2 mg, 2 mg, Intravenous, Q2H PRN **OR** morphINE PF 4 MG/ML injection 4 mg, 4 mg, Intravenous, Q2H PRN    calcium acetate (Phoslo) cap 667 mg, 1 capsule, Oral, BID    cetirizine (ZyrTEC) tab 10 mg, 10 mg, Oral, Daily    cyclobenzaprine (Flexeril) tab 5 mg, 5 mg, Oral, TID PRN    hydrALAzine  (Apresoline) 20 mg/mL injection 10 mg, 10 mg, Intravenous, Q4H PRN    Review of Systems: Multiple myeloma, SLOAN, obesity, HTN    Physical Exam:    General: Alert, orientated x3.  Cooperative.  No apparent distress.  Vital Signs:  Blood pressure (!) 183/91, pulse 86, temperature 97.6 °F (36.4 °C), temperature source Oral, resp. rate 18, height 5' (1.524 m), weight 233 lb (105.7 kg), SpO2 98%.    Extremities:  LLE externally rotated.  +hip TTP, no ecchymosis at this time. Neuro intact LLE.  No calf tenderness/swelling     Laboratory Data:  Lab Results   Component Value Date    WBC 5.6 12/30/2024    HGB 14.7 12/30/2024    HCT 43.2 12/30/2024    .0 12/30/2024    CREATSERUM 0.76 12/30/2024    BUN 12 12/30/2024     12/30/2024    K Negative 12/30/2024    K 3.6 12/30/2024     12/30/2024    CO2 24.0 12/30/2024     12/30/2024    CA 9.8 12/30/2024       Impression and Plan:  Patient Active Problem List   Diagnosis    Other closed fracture of left femur, unspecified portion of femur, initial encounter ([facility])    Closed fracture of proximal end of femur (HCC)       Left hip subtroch femur fracture    Will proceed with IMN left femur Tue  NPO after midnight  Ancef on call to OR  Discussed risks/benefits at length. Infection, nonuion, malunion, neurovasc injury, anesthetic risk, possible need for further surgery, DVT, PE.          Santo Dallas MD  12/30/2024  5:59 PM         [1]   Allergies  Allergen Reactions    Pravastatin MYALGIA    Dapsone OTHER (SEE COMMENTS)     Lowers hemoglobin    Mastisol Adhesive OTHER (SEE COMMENTS)     Weakens the skin    Atovaquone DIARRHEA    Bactrim [Sulfamethoxazole W/Trimethoprim] RASH

## 2024-12-30 NOTE — ED INITIAL ASSESSMENT (HPI)
Via ems from [facility] [employer] for mechanical trip and fall d/t carpet onto left hip. + CMS, PVS intact  150mcg fentanyl IV and 4mg IV zofran given by EMS. + daily baby aspirin. Denies head injury/LOC.

## 2024-12-30 NOTE — ED QUICK NOTES
Orders for admission, patient is aware of plan and ready to go upstairs. Any questions, please call ED RN ricardo at extension [phone].     Patient Covid vaccination status: Fully vaccinated     COVID Test Ordered in ED: None    COVID Suspicion at Admission: N/A    Running Infusions:  None    Mental Status/LOC at time of transport: x4    Other pertinent information:   CIWA score: N/A   NIH score:  N/A

## 2024-12-31 ENCOUNTER — APPOINTMENT (OUTPATIENT)
Dept: GENERAL RADIOLOGY | Facility: HOSPITAL | Age: 69
DRG: 481 | End: 2024-12-31
Attending: HOSPITALIST
Payer: MEDICARE

## 2024-12-31 ENCOUNTER — ANESTHESIA (OUTPATIENT)
Dept: SURGERY | Facility: HOSPITAL | Age: 69
DRG: 481 | End: 2024-12-31
Payer: MEDICARE

## 2024-12-31 ENCOUNTER — APPOINTMENT (OUTPATIENT)
Dept: GENERAL RADIOLOGY | Facility: HOSPITAL | Age: 69
DRG: 481 | End: 2024-12-31
Attending: PHYSICIAN ASSISTANT
Payer: MEDICARE

## 2024-12-31 ENCOUNTER — APPOINTMENT (OUTPATIENT)
Dept: GENERAL RADIOLOGY | Facility: HOSPITAL | Age: 69
DRG: 481 | End: 2024-12-31
Attending: ORTHOPAEDIC SURGERY
Payer: MEDICARE

## 2024-12-31 ENCOUNTER — ANESTHESIA EVENT (OUTPATIENT)
Dept: SURGERY | Facility: HOSPITAL | Age: 69
DRG: 481 | End: 2024-12-31
Payer: MEDICARE

## 2024-12-31 LAB
ANION GAP SERPL CALC-SCNC: 8 MMOL/L (ref 0–18)
ATRIAL RATE: 69 BPM
BASOPHILS # BLD AUTO: 0.04 X10(3) UL (ref 0–0.2)
BASOPHILS NFR BLD AUTO: 0.7 %
BUN BLD-MCNC: 11 MG/DL (ref 9–23)
BUN/CREAT SERPL: 13.9 (ref 10–20)
CALCIUM BLD-MCNC: 9.1 MG/DL (ref 8.7–10.4)
CHLORIDE SERPL-SCNC: 104 MMOL/L (ref 98–112)
CO2 SERPL-SCNC: 27 MMOL/L (ref 21–32)
CREAT BLD-MCNC: 0.79 MG/DL
DEPRECATED RDW RBC AUTO: 45.4 FL (ref 35.1–46.3)
EGFRCR SERPLBLD CKD-EPI 2021: 82 ML/MIN/1.73M2 (ref 60–?)
EOSINOPHIL # BLD AUTO: 0.04 X10(3) UL (ref 0–0.7)
EOSINOPHIL NFR BLD AUTO: 0.7 %
ERYTHROCYTE [DISTWIDTH] IN BLOOD BY AUTOMATED COUNT: 13.7 % (ref 11–15)
GLUCOSE BLD-MCNC: 109 MG/DL (ref 70–99)
HCT VFR BLD AUTO: 39.5 %
HGB BLD-MCNC: 13.2 G/DL
IMM GRANULOCYTES # BLD AUTO: 0.02 X10(3) UL (ref 0–1)
IMM GRANULOCYTES NFR BLD: 0.3 %
LYMPHOCYTES # BLD AUTO: 1.4 X10(3) UL (ref 1–4)
LYMPHOCYTES NFR BLD AUTO: 24.3 %
MCH RBC QN AUTO: 30.1 PG (ref 26–34)
MCHC RBC AUTO-ENTMCNC: 33.4 G/DL (ref 31–37)
MCV RBC AUTO: 90.2 FL
MONOCYTES # BLD AUTO: 0.55 X10(3) UL (ref 0.1–1)
MONOCYTES NFR BLD AUTO: 9.5 %
NEUTROPHILS # BLD AUTO: 3.71 X10 (3) UL (ref 1.5–7.7)
NEUTROPHILS # BLD AUTO: 3.71 X10(3) UL (ref 1.5–7.7)
NEUTROPHILS NFR BLD AUTO: 64.5 %
OSMOLALITY SERPL CALC.SUM OF ELEC: 288 MOSM/KG (ref 275–295)
P AXIS: 38 DEGREES
P-R INTERVAL: 154 MS
PLATELET # BLD AUTO: 166 10(3)UL (ref 150–450)
PLATELETS.RETICULATED NFR BLD AUTO: 2.2 % (ref 0–7)
POTASSIUM SERPL-SCNC: 4.3 MMOL/L (ref 3.5–5.1)
Q-T INTERVAL: 388 MS
QRS DURATION: 80 MS
QTC CALCULATION (BEZET): 415 MS
R AXIS: 8 DEGREES
RBC # BLD AUTO: 4.38 X10(6)UL
SODIUM SERPL-SCNC: 139 MMOL/L (ref 136–145)
T AXIS: 29 DEGREES
VENTRICULAR RATE: 69 BPM
WBC # BLD AUTO: 5.8 X10(3) UL (ref 4–11)

## 2024-12-31 PROCEDURE — 99233 SBSQ HOSP IP/OBS HIGH 50: CPT | Performed by: HOSPITALIST

## 2024-12-31 PROCEDURE — 0QS704Z REPOSITION LEFT UPPER FEMUR WITH INTERNAL FIXATION DEVICE, OPEN APPROACH: ICD-10-PCS | Performed by: ORTHOPAEDIC SURGERY

## 2024-12-31 PROCEDURE — 76000 FLUOROSCOPY <1 HR PHYS/QHP: CPT | Performed by: ORTHOPAEDIC SURGERY

## 2024-12-31 PROCEDURE — 73552 X-RAY EXAM OF FEMUR 2/>: CPT | Performed by: PHYSICIAN ASSISTANT

## 2024-12-31 PROCEDURE — 73552 X-RAY EXAM OF FEMUR 2/>: CPT | Performed by: HOSPITALIST

## 2024-12-31 DEVICE — 10MM/130 DEG TI CANN TFNA 380MM/LEFT - STERILE
Type: IMPLANTABLE DEVICE | Site: HIP | Status: FUNCTIONAL
Brand: TFN-ADVANCE

## 2024-12-31 DEVICE — LOCKING SCREW FOR IM NAIL Ø 5MM/ 40MM/ XL25/ STERILE: Type: IMPLANTABLE DEVICE | Site: FEMUR | Status: FUNCTIONAL

## 2024-12-31 DEVICE — LOCKING SCREW FOR IM NAIL Ø 5MM/ 36MM/ XL25/ STERILE: Type: IMPLANTABLE DEVICE | Site: HIP | Status: FUNCTIONAL

## 2024-12-31 DEVICE — TFNA FENESTRATED HELICAL BLADE 85MM - STERILE
Type: IMPLANTABLE DEVICE | Site: HIP | Status: FUNCTIONAL
Brand: TFN-ADVANCE

## 2024-12-31 RX ORDER — DEXAMETHASONE SODIUM PHOSPHATE 4 MG/ML
VIAL (ML) INJECTION AS NEEDED
Status: DISCONTINUED | OUTPATIENT
Start: 2024-12-31 | End: 2024-12-31 | Stop reason: SURG

## 2024-12-31 RX ORDER — ROCURONIUM BROMIDE 10 MG/ML
INJECTION, SOLUTION INTRAVENOUS AS NEEDED
Status: DISCONTINUED | OUTPATIENT
Start: 2024-12-31 | End: 2024-12-31 | Stop reason: SURG

## 2024-12-31 RX ORDER — MORPHINE SULFATE 4 MG/ML
2 INJECTION, SOLUTION INTRAMUSCULAR; INTRAVENOUS EVERY 10 MIN PRN
Status: DISCONTINUED | OUTPATIENT
Start: 2024-12-31 | End: 2024-12-31 | Stop reason: HOSPADM

## 2024-12-31 RX ORDER — DIPHENHYDRAMINE HYDROCHLORIDE 50 MG/ML
12.5 INJECTION INTRAMUSCULAR; INTRAVENOUS EVERY 4 HOURS PRN
Status: DISCONTINUED | OUTPATIENT
Start: 2024-12-31 | End: 2025-01-06

## 2024-12-31 RX ORDER — GLYCOPYRROLATE 0.2 MG/ML
INJECTION, SOLUTION INTRAMUSCULAR; INTRAVENOUS AS NEEDED
Status: DISCONTINUED | OUTPATIENT
Start: 2024-12-31 | End: 2024-12-31 | Stop reason: SURG

## 2024-12-31 RX ORDER — ASPIRIN 81 MG/1
81 TABLET ORAL 2 TIMES DAILY
Status: DISCONTINUED | OUTPATIENT
Start: 2024-12-31 | End: 2025-01-06

## 2024-12-31 RX ORDER — PROCHLORPERAZINE EDISYLATE 5 MG/ML
10 INJECTION INTRAMUSCULAR; INTRAVENOUS ONCE
Status: COMPLETED | OUTPATIENT
Start: 2024-12-31 | End: 2024-12-31

## 2024-12-31 RX ORDER — MORPHINE SULFATE 10 MG/ML
6 INJECTION, SOLUTION INTRAMUSCULAR; INTRAVENOUS EVERY 10 MIN PRN
Status: DISCONTINUED | OUTPATIENT
Start: 2024-12-31 | End: 2024-12-31 | Stop reason: HOSPADM

## 2024-12-31 RX ORDER — POLYETHYLENE GLYCOL 3350 17 G/17G
17 POWDER, FOR SOLUTION ORAL DAILY PRN
Status: DISCONTINUED | OUTPATIENT
Start: 2024-12-31 | End: 2025-01-06

## 2024-12-31 RX ORDER — SCOPOLAMINE 1 MG/3D
1 PATCH, EXTENDED RELEASE TRANSDERMAL
Status: DISCONTINUED | OUTPATIENT
Start: 2024-12-31 | End: 2024-12-31 | Stop reason: HOSPADM

## 2024-12-31 RX ORDER — ONDANSETRON 2 MG/ML
4 INJECTION INTRAMUSCULAR; INTRAVENOUS EVERY 4 HOURS PRN
Status: DISCONTINUED | OUTPATIENT
Start: 2024-12-31 | End: 2025-01-06

## 2024-12-31 RX ORDER — DOCUSATE SODIUM 100 MG/1
100 CAPSULE, LIQUID FILLED ORAL 2 TIMES DAILY
Status: DISCONTINUED | OUTPATIENT
Start: 2024-12-31 | End: 2025-01-06

## 2024-12-31 RX ORDER — SODIUM CHLORIDE, SODIUM LACTATE, POTASSIUM CHLORIDE, CALCIUM CHLORIDE 600; 310; 30; 20 MG/100ML; MG/100ML; MG/100ML; MG/100ML
INJECTION, SOLUTION INTRAVENOUS CONTINUOUS PRN
Status: DISCONTINUED | OUTPATIENT
Start: 2024-12-31 | End: 2024-12-31 | Stop reason: SURG

## 2024-12-31 RX ORDER — METOCLOPRAMIDE HYDROCHLORIDE 5 MG/ML
5 INJECTION INTRAMUSCULAR; INTRAVENOUS EVERY 8 HOURS PRN
Status: DISCONTINUED | OUTPATIENT
Start: 2024-12-31 | End: 2024-12-31 | Stop reason: HOSPADM

## 2024-12-31 RX ORDER — HYDROCODONE BITARTRATE AND ACETAMINOPHEN 10; 325 MG/1; MG/1
1 TABLET ORAL EVERY 6 HOURS PRN
Status: DISCONTINUED | OUTPATIENT
Start: 2024-12-31 | End: 2025-01-06

## 2024-12-31 RX ORDER — ONDANSETRON 2 MG/ML
4 INJECTION INTRAMUSCULAR; INTRAVENOUS EVERY 6 HOURS PRN
Status: DISCONTINUED | OUTPATIENT
Start: 2024-12-31 | End: 2024-12-31 | Stop reason: HOSPADM

## 2024-12-31 RX ORDER — NEOSTIGMINE METHYLSULFATE 1 MG/ML
INJECTION INTRAVENOUS AS NEEDED
Status: DISCONTINUED | OUTPATIENT
Start: 2024-12-31 | End: 2024-12-31 | Stop reason: SURG

## 2024-12-31 RX ORDER — HYDROMORPHONE HYDROCHLORIDE 1 MG/ML
0.2 INJECTION, SOLUTION INTRAMUSCULAR; INTRAVENOUS; SUBCUTANEOUS EVERY 5 MIN PRN
Status: DISCONTINUED | OUTPATIENT
Start: 2024-12-31 | End: 2024-12-31 | Stop reason: HOSPADM

## 2024-12-31 RX ORDER — HYDROMORPHONE HYDROCHLORIDE 1 MG/ML
INJECTION, SOLUTION INTRAMUSCULAR; INTRAVENOUS; SUBCUTANEOUS AS NEEDED
Status: DISCONTINUED | OUTPATIENT
Start: 2024-12-31 | End: 2024-12-31 | Stop reason: SURG

## 2024-12-31 RX ORDER — ONDANSETRON 2 MG/ML
INJECTION INTRAMUSCULAR; INTRAVENOUS AS NEEDED
Status: DISCONTINUED | OUTPATIENT
Start: 2024-12-31 | End: 2024-12-31 | Stop reason: SURG

## 2024-12-31 RX ORDER — SODIUM CHLORIDE, SODIUM LACTATE, POTASSIUM CHLORIDE, CALCIUM CHLORIDE 600; 310; 30; 20 MG/100ML; MG/100ML; MG/100ML; MG/100ML
INJECTION, SOLUTION INTRAVENOUS CONTINUOUS
Status: DISCONTINUED | OUTPATIENT
Start: 2024-12-31 | End: 2024-12-31 | Stop reason: HOSPADM

## 2024-12-31 RX ORDER — HYDROMORPHONE HYDROCHLORIDE 1 MG/ML
0.4 INJECTION, SOLUTION INTRAMUSCULAR; INTRAVENOUS; SUBCUTANEOUS EVERY 5 MIN PRN
Status: DISCONTINUED | OUTPATIENT
Start: 2024-12-31 | End: 2024-12-31 | Stop reason: HOSPADM

## 2024-12-31 RX ORDER — METOCLOPRAMIDE HYDROCHLORIDE 5 MG/ML
5 INJECTION INTRAMUSCULAR; INTRAVENOUS EVERY 8 HOURS PRN
Status: DISCONTINUED | OUTPATIENT
Start: 2024-12-31 | End: 2025-01-06

## 2024-12-31 RX ORDER — HYDROMORPHONE HYDROCHLORIDE 1 MG/ML
0.4 INJECTION, SOLUTION INTRAMUSCULAR; INTRAVENOUS; SUBCUTANEOUS EVERY 2 HOUR PRN
Status: DISCONTINUED | OUTPATIENT
Start: 2024-12-31 | End: 2025-01-06

## 2024-12-31 RX ORDER — DIPHENHYDRAMINE HYDROCHLORIDE 50 MG/ML
25 INJECTION INTRAMUSCULAR; INTRAVENOUS ONCE AS NEEDED
Status: ACTIVE | OUTPATIENT
Start: 2024-12-31 | End: 2024-12-31

## 2024-12-31 RX ORDER — SENNOSIDES 8.6 MG
17.2 TABLET ORAL NIGHTLY
Status: DISCONTINUED | OUTPATIENT
Start: 2024-12-31 | End: 2025-01-06

## 2024-12-31 RX ORDER — DIPHENHYDRAMINE HCL 25 MG
25 CAPSULE ORAL EVERY 4 HOURS PRN
Status: DISCONTINUED | OUTPATIENT
Start: 2024-12-31 | End: 2025-01-06

## 2024-12-31 RX ORDER — ACETAMINOPHEN 10 MG/ML
1000 INJECTION, SOLUTION INTRAVENOUS ONCE AS NEEDED
Status: COMPLETED | OUTPATIENT
Start: 2024-12-31 | End: 2024-12-31

## 2024-12-31 RX ORDER — BISACODYL 10 MG
10 SUPPOSITORY, RECTAL RECTAL
Status: DISCONTINUED | OUTPATIENT
Start: 2024-12-31 | End: 2025-01-06

## 2024-12-31 RX ORDER — MIDAZOLAM HYDROCHLORIDE 1 MG/ML
INJECTION INTRAMUSCULAR; INTRAVENOUS AS NEEDED
Status: DISCONTINUED | OUTPATIENT
Start: 2024-12-31 | End: 2024-12-31 | Stop reason: SURG

## 2024-12-31 RX ORDER — ONDANSETRON 2 MG/ML
4 INJECTION INTRAMUSCULAR; INTRAVENOUS EVERY 6 HOURS PRN
Status: DISCONTINUED | OUTPATIENT
Start: 2024-12-31 | End: 2024-12-31

## 2024-12-31 RX ORDER — NALOXONE HYDROCHLORIDE 0.4 MG/ML
0.08 INJECTION, SOLUTION INTRAMUSCULAR; INTRAVENOUS; SUBCUTANEOUS AS NEEDED
Status: DISCONTINUED | OUTPATIENT
Start: 2024-12-31 | End: 2024-12-31 | Stop reason: HOSPADM

## 2024-12-31 RX ORDER — HYDROMORPHONE HYDROCHLORIDE 1 MG/ML
0.6 INJECTION, SOLUTION INTRAMUSCULAR; INTRAVENOUS; SUBCUTANEOUS EVERY 5 MIN PRN
Status: DISCONTINUED | OUTPATIENT
Start: 2024-12-31 | End: 2024-12-31 | Stop reason: HOSPADM

## 2024-12-31 RX ORDER — LIDOCAINE HYDROCHLORIDE 10 MG/ML
INJECTION, SOLUTION EPIDURAL; INFILTRATION; INTRACAUDAL; PERINEURAL AS NEEDED
Status: DISCONTINUED | OUTPATIENT
Start: 2024-12-31 | End: 2024-12-31 | Stop reason: SURG

## 2024-12-31 RX ORDER — MORPHINE SULFATE 4 MG/ML
4 INJECTION, SOLUTION INTRAMUSCULAR; INTRAVENOUS EVERY 10 MIN PRN
Status: DISCONTINUED | OUTPATIENT
Start: 2024-12-31 | End: 2024-12-31 | Stop reason: HOSPADM

## 2024-12-31 RX ORDER — SODIUM PHOSPHATE, DIBASIC AND SODIUM PHOSPHATE, MONOBASIC 7; 19 G/230ML; G/230ML
1 ENEMA RECTAL ONCE AS NEEDED
Status: DISCONTINUED | OUTPATIENT
Start: 2024-12-31 | End: 2025-01-06

## 2024-12-31 RX ADMIN — LIDOCAINE HYDROCHLORIDE 30 MG: 10 INJECTION, SOLUTION EPIDURAL; INFILTRATION; INTRACAUDAL; PERINEURAL at 13:14:00

## 2024-12-31 RX ADMIN — ONDANSETRON 4 MG: 2 INJECTION INTRAMUSCULAR; INTRAVENOUS at 15:06:00

## 2024-12-31 RX ADMIN — ROCURONIUM BROMIDE 40 MG: 10 INJECTION, SOLUTION INTRAVENOUS at 13:14:00

## 2024-12-31 RX ADMIN — GLYCOPYRROLATE 0.4 MG: 0.2 INJECTION, SOLUTION INTRAMUSCULAR; INTRAVENOUS at 15:05:00

## 2024-12-31 RX ADMIN — SODIUM CHLORIDE, SODIUM LACTATE, POTASSIUM CHLORIDE, CALCIUM CHLORIDE: 600; 310; 30; 20 INJECTION, SOLUTION INTRAVENOUS at 14:37:00

## 2024-12-31 RX ADMIN — DEXAMETHASONE SODIUM PHOSPHATE 8 MG: 4 MG/ML VIAL (ML) INJECTION at 13:17:00

## 2024-12-31 RX ADMIN — CEFAZOLIN SODIUM IN 0.9 % NACL 3 G: 3 G/100 ML INTRAVENOUS SOLUTION, PIGGYBACK (ML) INTRAVENOUS at 13:17:00

## 2024-12-31 RX ADMIN — HYDROMORPHONE HYDROCHLORIDE 0.5 MG: 1 INJECTION, SOLUTION INTRAMUSCULAR; INTRAVENOUS; SUBCUTANEOUS at 15:26:00

## 2024-12-31 RX ADMIN — MIDAZOLAM HYDROCHLORIDE 2 MG: 1 INJECTION INTRAMUSCULAR; INTRAVENOUS at 13:10:00

## 2024-12-31 RX ADMIN — SODIUM CHLORIDE, SODIUM LACTATE, POTASSIUM CHLORIDE, CALCIUM CHLORIDE: 600; 310; 30; 20 INJECTION, SOLUTION INTRAVENOUS at 13:07:00

## 2024-12-31 RX ADMIN — NEOSTIGMINE METHYLSULFATE 3 MG: 1 INJECTION INTRAVENOUS at 15:05:00

## 2024-12-31 NOTE — DISCHARGE INSTRUCTIONS
Weight-bearing as tolerated with walker    Keep dressing intact for 1 week, changing only if >50% saturated  Change dressing in 1 week if not previously changed  May shower with water-proof dressing intact    Use over the counter stool softener daily while taking pain medication - Colace 100mg twice a day AND Senokot 17.2mg every night. If no bowel movement in 2 days, obtain Magnesium Citrate and take as directed.  Start 81mg Aspirin tonight with dinner. Continue 81mg twice a day for 6 weeks.    Follow-up in office in 2 weeks

## 2024-12-31 NOTE — ED PROVIDER NOTES
Patient Seen in: [facility] Emergency Department    History     Chief Complaint   Patient presents with    Leg or Foot Injury       HPI    Patient presents to the ED after tripping on the carpet at a car [employer] and falling and injuring her left hip.  She denies other injury.  No head injury or other complaints.  Severe pain in her left hip.  Unable to walk.    History reviewed.   Past Medical History:    Back problem    Cancer (HCC)    MULTIPLE MYELOMA    Cataract    High blood pressure    IBS (irritable bowel syndrome)    Osteoarthritis    Sleep apnea    USES CPAP    Visual impairment    WEARS GLASSES       History reviewed.   Past Surgical History:   Procedure Laterality Date    Cholecystectomy      Hysterectomy      Other  2008    BLADDER LIFT    Other  2015    LAMINECTOMY,SYNOVIAL CYST REMOVAL    Other  2010,2016    STEM CELL TRANSPLANT    Tonsillectomy           Medications :  Prescriptions Prior to Admission[1]     Family History   Problem Relation Age of Onset    Heart Disease Father     Other (COPD) Mother     Diabetes Sister        Smoking Status:   Social History     Socioeconomic History    Marital status:    Tobacco Use    Smoking status: Never    Smokeless tobacco: Never   Vaping Use    Vaping status: Never Used   Substance and Sexual Activity    Alcohol use: Yes     Comment: RARE    Drug use: No       Constitutional and vital signs reviewed.      Social History and Family History elements reviewed from today, pertinent positives to the presenting problem noted.    Physical Exam     ED Triage Vitals   BP 12/30/24 1041 (!) 212/87   Pulse 12/30/24 1041 68   Resp 12/30/24 1041 17   Temp 12/30/24 1038 97.6 °F (36.4 °C)   Temp src 12/30/24 1038 Oral   SpO2 12/30/24 1041 97 %   O2 Device 12/30/24 1041 None (Room air)       All measures to prevent infection transmission during my interaction with the patient were taken. Handwashing was performed prior to and after the exam.  Stethoscope and  any equipment used during my examination was cleaned with super sani-cloth germicidal wipes following the exam.     Physical Exam  Vitals and nursing note reviewed.   Constitutional:       General: She is in acute distress.      Appearance: She is well-developed. She is obese. She is not ill-appearing or toxic-appearing.   HENT:      Head: Normocephalic and atraumatic.   Eyes:      General:         Right eye: No discharge.         Left eye: No discharge.      Conjunctiva/sclera: Conjunctivae normal.   Neck:      Trachea: No tracheal deviation.   Cardiovascular:      Rate and Rhythm: Normal rate.      Pulses: Normal pulses.   Pulmonary:      Effort: Pulmonary effort is normal. No respiratory distress.      Breath sounds: No stridor.   Abdominal:      General: There is no distension.      Palpations: Abdomen is soft.   Musculoskeletal:         General: Swelling, tenderness and deformity present.      Comments: Tenderness to the left hip and obvious deformity with the left leg shortened and externally rotated.  Normal dorsalis pedis pulse to the left leg and normal strength and sensation in the left foot.   Skin:     General: Skin is warm and dry.   Neurological:      Mental Status: She is alert and oriented to person, place, and time.   Psychiatric:         Mood and Affect: Mood normal.         Behavior: Behavior normal.         ED Course        Labs Reviewed   CBC WITH DIFFERENTIAL WITH PLATELET - Abnormal; Notable for the following components:       Result Value    .0 (*)     All other components within normal limits   BASIC METABOLIC PANEL (8) - Abnormal; Notable for the following components:    Glucose 119 (*)     All other components within normal limits   ED/MRSA SCREEN BY PCR-CC - Normal   TYPE AND SCREEN    Narrative:     The following orders were created for panel order Type and screen.                  Procedure                               Abnormality         Status                                      ---------                               -----------         ------                                     ABORH (Blood Type)[[id number]]                               Final result                               Antibody Screen[[id number]]                                  Final result                                                 Please view results for these tests on the individual orders.   ABORH (BLOOD TYPE)   ANTIBODY SCREEN   ABORH CONFIRMATION   DIRECT ANTIGLOBULIN TEST (JOSE MIGUEL)   K ANTIGEN   RAINBOW DRAW LAVENDER   RAINBOW DRAW LIGHT GREEN   RAINBOW DRAW BLUE     EKG    Rate, intervals and axes as noted on EKG Report.  Rate: Normal, 69 bpm  Rhythm: Sinus Rhythm  Reading: Normal intervals, normal ST segments, normal EKG           As Interpreted by me    Imaging Results Available and Reviewed while in ED: XR HIP W OR WO PELVIS 2 OR 3 VIEWS, LEFT (CPT=73502)    Result Date: 12/30/2024  CONCLUSION:   Acute, foreshortened, and displaced fracture involving the proximal left femoral shaft.  Recommend orthopedic surgery consultation.  No dislocation.     Dictated by (CST): Luis Chirinos MD on 12/30/2024 at 11:47 AM     Finalized by (CST): Luis Chirinos MD on 12/30/2024 at 11:51 AM         ED Medications Administered:   Medications   acetaminophen (Tylenol Extra Strength) tab 500 mg (500 mg Oral Given 12/30/24 1328)   metoclopramide (Reglan) 5 mg/mL injection 5 mg (5 mg Intravenous Given 12/30/24 1546)   temazepam (Restoril) cap 15 mg (has no administration in time range)   morphINE PF 2 MG/ML injection 1 mg ( Intravenous See Alternative 12/30/24 1758)     Or   morphINE PF 2 MG/ML injection 2 mg ( Intravenous See Alternative 12/30/24 1758)     Or   morphINE PF 4 MG/ML injection 4 mg (4 mg Intravenous Given 12/30/24 1758)   calcium acetate (Phoslo) cap 667 mg (has no administration in time range)   cetirizine (ZyrTEC) tab 10 mg (10 mg Oral Not Given 12/30/24 1430)   cyclobenzaprine (Flexeril) tab 5 mg (has no  administration in time range)   hydrALAzine (Apresoline) 20 mg/mL injection 10 mg (10 mg Intravenous Given 12/30/24 1758)   ceFAZolin (Ancef) 3 g in sodium chloride 0.9% 100mL IVPB premix (has no administration in time range)   ondansetron (Zofran) 4 MG/2ML injection 4 mg (has no administration in time range)   morphINE PF 4 MG/ML injection 8 mg (8 mg Intravenous Given 12/30/24 1211)   enoxaparin (Lovenox) 40 MG/0.4ML SUBQ injection 40 mg (40 mg Subcutaneous Given 12/30/24 1328)   morphINE PF 4 MG/ML injection 4 mg (4 mg Intravenous Given 12/30/24 1240)         MDM     Vitals:    12/30/24 1603 12/30/24 1657 12/30/24 1725 12/30/24 1804   BP: 154/89 (!) 183/91  (!) 173/86   BP Location:    Right arm   Pulse:  87 86 83   Resp:       Temp:       TempSrc:       SpO2:       Weight:       Height:         *I personally reviewed and interpreted all ED vitals.    Pulse Ox: 98%, Room air, Normal     Monitor Interpretation:   normal sinus rhythm as interpreted by me.  The cardiac monitor was ordered to monitor heart rate.    Differential Diagnosis/ Diagnostic Considerations: Hip fracture, Hip dislocation,other    Complicating Factors: The patient already has does not have any pertinent problems on file. to contribute to the complexity of this ED evaluation.    Medical Decision Making  Patient presents to the ED with a left hip injury.  X-ray confirms a displaced proximal femoral fracture.  Neurovascular intact on exam.  I discussed with Dr. Dallas for orthopedic consultation who recommends 5 pounds of Mendosa's traction.  Patient was given pain meds in the ED.  I discussed with Dr. Pierson for admission.    Problems Addressed:  Other closed fracture of left femur, unspecified portion of femur, initial encounter (HCC): acute illness or injury that poses a threat to life or bodily functions    Amount and/or Complexity of Data Reviewed  Labs: ordered. Decision-making details documented in ED Course.  Radiology: ordered and  independent interpretation performed. Decision-making details documented in ED Course.     Details: I personally reviewed the patient's left hip x-ray image and noted a fracture of the proximal femur  ECG/medicine tests: ordered and independent interpretation performed. Decision-making details documented in ED Course.  Discussion of management or test interpretation with external provider(s):  I discussed with Dr. Pierson for admission.    Risk  Parenteral controlled substances.        Condition upon leaving the department: Stable    Disposition and Plan     Clinical Impression:  1. Other closed fracture of left femur, unspecified portion of femur, initial encounter ([facility])        Disposition:  Admit    Follow-up:  No follow-up provider specified.    Medications Prescribed:  Current Discharge Medication List          Hospital Problems       Present on Admission  Date Reviewed: 5/7/2021            ICD-10-CM Noted POA    * (Principal) Other closed fracture of left femur, unspecified portion of femur, initial encounter ([facility]) S72.8X2A 12/30/2024 Unknown    Closed fracture of proximal end of femur ([facility]) S72.009A 12/30/2024 Unknown                       [1]   Medications Prior to Admission   Medication Sig Dispense Refill Last Dose/Taking    gabapentin 300 MG Oral Cap Take 1 capsule (300 mg total) by mouth every evening. (Patient taking differently: Take 1 capsule (300 mg total) by mouth nightly as needed (neuropathy lower extremities).)   Taking Differently    calcium acetate 667 MG Oral Cap Take 1 capsule (667 mg total) by mouth 2 (two) times daily.   12/30/2024 at  9:00 AM    Omega-3 Fatty Acids (FISH OIL ADULT GUMMIES OR) Take 1 Piece of gum by mouth daily.   12/30/2024 at  9:00 AM    Potassium Chloride ER 20 MEQ Oral Tab CR Take 20 mEq by mouth 2 (two) times daily.   12/30/2024 at  9:00 AM    acyclovir 400 MG Oral Tab Take 1 tablet (400 mg total) by mouth 2 (two) times daily.   12/30/2024 at  9:00 AM    Fluticasone  Propionate 50 MCG/ACT Nasal Suspension 1 spray by Each Nare route daily as needed for Rhinitis.   Taking As Needed    cetirizine 10 MG Oral Tab Take 1 tablet (10 mg total) by mouth daily.   12/30/2024 at  9:00 AM    acetaminophen 500 MG Oral Tab Take 1 tablet (500 mg total) by mouth every 6 (six) hours as needed for Pain.   Taking As Needed    Pomalidomide 1 MG Oral Cap Take 1 tablet by mouth daily.   12/30/2024 at  9:00 AM    aspirin 81 MG Oral Tab EC Take 1 tablet (81 mg total) by mouth daily.   12/30/2024 at  9:00 AM    Cyclobenzaprine HCl 5 MG Oral Tab Take 1 tablet (5 mg total) by mouth 3 (three) times daily as needed for Muscle spasms.   12/30/2024 at  9:00 AM

## 2024-12-31 NOTE — ANESTHESIA PREPROCEDURE EVALUATION
Anesthesia PreOp Note    HPI:     Jackelin Mackey is a 69 year old female who presents for preoperative consultation requested by: Santo Dallas MD    Date of Surgery: 12/30/2024 - 12/31/2024    Procedure(s):  Left hip nailing  Indication: Left femur fracture    Relevant Problems   No relevant active problems       NPO:  Last Liquid Consumption Date: 12/30/24  Last Liquid Consumption Time: 0900  Last Solid Consumption Date: 12/30/24  Last Solid Consumption Time: 0900  Last Liquid Consumption Date: 12/30/24          History Review:  Patient Active Problem List    Diagnosis Date Noted    Other closed fracture of left femur, unspecified portion of femur, initial encounter ([facility]) 12/30/2024    Closed fracture of proximal end of femur ([facility]) 12/30/2024       Past Medical History:    Back problem    Cancer ([facility])    MULTIPLE MYELOMA    Cataract    High blood pressure    IBS (irritable bowel syndrome)    Osteoarthritis    Sleep apnea    USES CPAP    Visual impairment    WEARS GLASSES       Past Surgical History:   Procedure Laterality Date    Cholecystectomy      Hysterectomy      Other  2008    BLADDER LIFT    Other  2015    LAMINECTOMY,SYNOVIAL CYST REMOVAL    Other  2010,2016    STEM CELL TRANSPLANT    Tonsillectomy         Prescriptions Prior to Admission[1]  Current Medications and Prescriptions Ordered in Epic[2]    Allergies[3]    Family History   Problem Relation Age of Onset    Heart Disease Father     Other (COPD) Mother     Diabetes Sister      Social History     Socioeconomic History    Marital status:    Tobacco Use    Smoking status: Never    Smokeless tobacco: Never   Vaping Use    Vaping status: Never Used   Substance and Sexual Activity    Alcohol use: Yes     Comment: RARE    Drug use: No       Available pre-op labs reviewed.  Lab Results   Component Value Date    WBC 5.8 12/31/2024    RBC 4.38 12/31/2024    HGB 13.2 12/31/2024    HCT 39.5 12/31/2024    MCV 90.2 12/31/2024    MCH 30.1  12/31/2024    MCHC 33.4 12/31/2024    RDW 13.7 12/31/2024    .0 12/31/2024     Lab Results   Component Value Date     12/31/2024    K 4.3 12/31/2024     12/31/2024    CO2 27.0 12/31/2024    BUN 11 12/31/2024    CREATSERUM 0.79 12/31/2024     (H) 12/31/2024    CA 9.1 12/31/2024          Vital Signs:  Body mass index is 45.5 kg/m².   height is 1.524 m (5') and weight is 105.7 kg (233 lb). Her oral temperature is 98.3 °F (36.8 °C). Her blood pressure is 138/69 and her pulse is 77. Her respiration is 17 and oxygen saturation is 96%.   Vitals:    12/31/24 0300 12/31/24 0415 12/31/24 0955 12/31/24 1231   BP:  143/79 137/79 138/69   Pulse: 85  77 77   Resp:  18 18 17   Temp:  98.6 °F (37 °C) 99.6 °F (37.6 °C) 98.3 °F (36.8 °C)   TempSrc:  Oral Oral Oral   SpO2:  96% 95% 96%   Weight:       Height:            Anesthesia Evaluation     Patient summary reviewed and Nursing notes reviewed    History of anesthetic complications   Airway   Mallampati: II  TM distance: >3 FB  Neck ROM: full  Dental - Dentition appears grossly intact     Pulmonary - negative ROS and normal exam   (+) sleep apnea  Cardiovascular - negative ROS and normal exam  Exercise tolerance: good  (+) hypertension    NYHA Classification: I  Rhythm: regular  Rate: normal    Neuro/Psych - negative ROS     GI/Hepatic/Renal - negative ROS     Endo/Other - negative ROS   Abdominal  - normal exam                 Anesthesia Plan:   ASA:  2  Plan:   General  Informed Consent Plan and Risks Discussed With:  Patient      I have informed Jackelin Mackey and/or legal guardian or family member of the nature of the anesthetic plan, benefits, risks including possible dental damage if relevant, major complications, and any alternative forms of anesthetic management.   All of the patient's questions were answered to the best of my ability. The patient desires the anesthetic management as planned.  Luz Rush MD  12/31/2024 12:54  PM  Present on Admission:  **None**           [1]   Medications Prior to Admission   Medication Sig Dispense Refill Last Dose/Taking    gabapentin 300 MG Oral Cap Take 1 capsule (300 mg total) by mouth every evening. (Patient taking differently: Take 1 capsule (300 mg total) by mouth nightly as needed (neuropathy lower extremities).)   Taking Differently    calcium acetate 667 MG Oral Cap Take 1 capsule (667 mg total) by mouth 2 (two) times daily.   12/30/2024 at  9:00 AM    Omega-3 Fatty Acids (FISH OIL ADULT GUMMIES OR) Take 1 Piece of gum by mouth daily.   12/30/2024 at  9:00 AM    Potassium Chloride ER 20 MEQ Oral Tab CR Take 20 mEq by mouth 2 (two) times daily.   12/30/2024 at  9:00 AM    acyclovir 400 MG Oral Tab Take 1 tablet (400 mg total) by mouth 2 (two) times daily.   12/30/2024 at  9:00 AM    Fluticasone Propionate 50 MCG/ACT Nasal Suspension 1 spray by Each Nare route daily as needed for Rhinitis.   Taking As Needed    cetirizine 10 MG Oral Tab Take 1 tablet (10 mg total) by mouth daily.   12/30/2024 at  9:00 AM    acetaminophen 500 MG Oral Tab Take 1 tablet (500 mg total) by mouth every 6 (six) hours as needed for Pain.   Taking As Needed    Pomalidomide 1 MG Oral Cap Take 1 tablet by mouth daily.   12/30/2024 at  9:00 AM    aspirin 81 MG Oral Tab EC Take 1 tablet (81 mg total) by mouth daily.   12/30/2024 at  9:00 AM    Cyclobenzaprine HCl 5 MG Oral Tab Take 1 tablet (5 mg total) by mouth 3 (three) times daily as needed for Muscle spasms.   12/30/2024 at  9:00 AM   [2]   Current Facility-Administered Medications Ordered in Epic   Medication Dose Route Frequency Provider Last Rate Last Admin    [Transfer Hold] HYDROmorphone (Dilaudid) 1 MG/ML injection 0.4 mg  0.4 mg Intravenous Q2H PRN Renteta Pritchett PA-C        [Transfer Hold] acetaminophen (Tylenol Extra Strength) tab 500 mg  500 mg Oral Q4H PRN Yoana Pierson MD   500 mg at 12/30/24 1328    [Transfer Hold] metoclopramide (Reglan) 5 mg/mL  injection 5 mg  5 mg Intravenous Q8H PRN Yoana Pierson MD   5 mg at 12/30/24 1546    [Transfer Hold] temazepam (Restoril) cap 15 mg  15 mg Oral Nightly PRN Yoana Pierson MD        [Transfer Hold] calcium acetate (Phoslo) cap 667 mg  1 capsule Oral BID Yoana Pierson MD   667 mg at 12/30/24 2000    [Transfer Hold] cetirizine (ZyrTEC) tab 10 mg  10 mg Oral Daily Yoana Pierson MD        [Transfer Hold] cyclobenzaprine (Flexeril) tab 5 mg  5 mg Oral TID PRN Yoana Pierson MD        [Transfer Hold] hydrALAzine (Apresoline) 20 mg/mL injection 10 mg  10 mg Intravenous Q4H PRN Yoana Pierson MD   10 mg at 12/30/24 1758    ceFAZolin (Ancef) 3 g in sodium chloride 0.9% 100mL IVPB premix  3 g Intravenous On Call to OR Santo Dallas MD        [Transfer Hold] ondansetron (Zofran) 4 MG/2ML injection 4 mg  4 mg Intravenous Q4H PRN Yoana Pierson MD   4 mg at 12/31/24 0943     No current Epic-ordered outpatient medications on file.   [3]   Allergies  Allergen Reactions    Pravastatin MYALGIA    Dapsone OTHER (SEE COMMENTS)     Lowers hemoglobin    Mastisol Adhesive OTHER (SEE COMMENTS)     Weakens the skin    Atovaquone DIARRHEA    Bactrim [Sulfamethoxazole W/Trimethoprim] RASH

## 2024-12-31 NOTE — PLAN OF CARE
Patient is POD 0 of L hip pinning. Dressings CDI. A/Ox4. On RA, CPAP at night. Voiding via purewick. Remote tele in place. PRN zofran given for nausea. Call light and personal items within reach. Discharge plans to follow.      Problem: PAIN - ADULT  Goal: Verbalizes/displays adequate comfort level or patient's stated pain goal  Description: INTERVENTIONS:  - Encourage pt to monitor pain and request assistance  - Assess pain using appropriate pain scale  - Administer analgesics based on type and severity of pain and evaluate response  - Implement non-pharmacological measures as appropriate and evaluate response  - Consider cultural and social influences on pain and pain management  - Manage/alleviate anxiety  - Utilize distraction and/or relaxation techniques  - Monitor for opioid side effects  - Notify MD/LIP if interventions unsuccessful or patient reports new pain  - Anticipate increased pain with activity and pre-medicate as appropriate  Outcome: Progressing     Problem: RISK FOR INFECTION - ADULT  Goal: Absence of fever/infection during anticipated neutropenic period  Description: INTERVENTIONS  - Monitor WBC  - Administer growth factors as ordered  - Implement neutropenic guidelines  Outcome: Progressing     Problem: SAFETY ADULT - FALL  Goal: Free from fall injury  Description: INTERVENTIONS:  - Assess pt frequently for physical needs  - Identify cognitive and physical deficits and behaviors that affect risk of falls.  - Peerless fall precautions as indicated by assessment.  - Educate pt/family on patient safety including physical limitations  - Instruct pt to call for assistance with activity based on assessment  - Modify environment to reduce risk of injury  - Provide assistive devices as appropriate  - Consider OT/PT consult to assist with strengthening/mobility  - Encourage toileting schedule  Outcome: Progressing     Problem: DISCHARGE PLANNING  Goal: Discharge to home or other facility with appropriate  resources  Description: INTERVENTIONS:  - Identify barriers to discharge w/pt and caregiver  - Include patient/family/discharge partner in discharge planning  - Arrange for needed discharge resources and transportation as appropriate  - Identify discharge learning needs (meds, wound care, etc)  - Arrange for interpreters to assist at discharge as needed  - Consider post-discharge preferences of patient/family/discharge partner  - Complete POLST form as appropriate  - Assess patient's ability to be responsible for managing their own health  - Refer to Case Management Department for coordinating discharge planning if the patient needs post-hospital services based on physician/LIP order or complex needs related to functional status, cognitive ability or social support system  Outcome: Progressing

## 2024-12-31 NOTE — ANESTHESIA PROCEDURE NOTES
Airway  Date/Time: 12/31/2024 1:15 PM  Urgency: elective      General Information and Staff    Patient location during procedure: OR  Anesthesiologist: Luz uRsh MD  Performed: anesthesiologist   Performed by: Luz Rush MD  Authorized by: Luz Rush MD      Indications and Patient Condition  Indications for airway management: anesthesia  Patient position: sniffing  Mask difficulty assessment: 0 - not attempted    Final Airway Details  Final airway type: endotracheal airway      Successful airway: ETT     Successful intubation technique: Video laryngoscopy  Endotracheal tube insertion site: oral  Blade size: #3  ETT size (mm): 7.0    Cormack-Lehane Classification: grade I - full view of glottis  Number of attempts at approach: 1

## 2024-12-31 NOTE — PLAN OF CARE
Patient has safety precautions in place bed in the lowest position, bed alarm on, and call light within reach. Plan of care ongoing. No further concerns as of present.    Problem: Patient Centered Care  Goal: Patient preferences are identified and integrated in the patient's plan of care  Description: Interventions:    - Provide timely, complete, and accurate information to patient/family  - Incorporate patient and family knowledge, values, beliefs, and cultural backgrounds into the planning and delivery of care  - Encourage patient/family to participate in care and decision-making at the level they choose  - Honor patient and family perspectives and choices  Outcome: Progressing     Problem: PAIN - ADULT  Goal: Verbalizes/displays adequate comfort level or patient's stated pain goal  Description: INTERVENTIONS:  - Encourage pt to monitor pain and request assistance  - Assess pain using appropriate pain scale  - Administer analgesics based on type and severity of pain and evaluate response  - Implement non-pharmacological measures as appropriate and evaluate response  - Consider cultural and social influences on pain and pain management  - Manage/alleviate anxiety  - Utilize distraction and/or relaxation techniques  - Monitor for opioid side effects  - Notify MD/LIP if interventions unsuccessful or patient reports new pain  - Anticipate increased pain with activity and pre-medicate as appropriate  Outcome: Progressing     Problem: RISK FOR INFECTION - ADULT  Goal: Absence of fever/infection during anticipated neutropenic period  Description: INTERVENTIONS  - Monitor WBC  - Administer growth factors as ordered  - Implement neutropenic guidelines  Outcome: Progressing     Problem: SAFETY ADULT - FALL  Goal: Free from fall injury  Description: INTERVENTIONS:  - Assess pt frequently for physical needs  - Identify cognitive and physical deficits and behaviors that affect risk of falls.  - [place] fall precautions as  indicated by assessment.  - Educate pt/family on patient safety including physical limitations  - Instruct pt to call for assistance with activity based on assessment  - Modify environment to reduce risk of injury  - Provide assistive devices as appropriate  - Consider OT/PT consult to assist with strengthening/mobility  - Encourage toileting schedule  Outcome: Progressing     Problem: DISCHARGE PLANNING  Goal: Discharge to home or other facility with appropriate resources  Description: INTERVENTIONS:  - Identify barriers to discharge w/pt and caregiver  - Include patient/family/discharge partner in discharge planning  - Arrange for needed discharge resources and transportation as appropriate  - Identify discharge learning needs (meds, wound care, etc)  - Arrange for interpreters to assist at discharge as needed  - Consider post-discharge preferences of patient/family/discharge partner  - Complete POLST form as appropriate  - Assess patient's ability to be responsible for managing their own health  - Refer to Case Management Department for coordinating discharge planning if the patient needs post-hospital services based on physician/LIP order or complex needs related to functional status, cognitive ability or social support system  Outcome: Progressing

## 2024-12-31 NOTE — CM/SW NOTE
12/31/24 1200   CM/ Referral Data   Referral Source Physician   Reason for Referral Discharge planning   Informant Patient   Medical Hx   Does patient have an established PCP? Yes   Significant Past Medical/Mental Health Hx fall, lt femur fx   Patient Info   Patient's Current Mental Status at Time of Assessment Alert;Oriented   Patient's Home Environment House   Number of Levels in Home 1   Number of Stair in Home 7   Patient lives with Spouse/Significant other   Patient Status Prior to Admission   Independent with ADLs and Mobility Yes   Discharge Needs   Anticipated D/C needs To be determined;Home health care     CM met with pt and 2 family members at bedside. Introduced self and role. Pt is from home and is independent with ADLs and ambulation at base line. Pt is retired.  Pt uses a CPAP ant night.    CM discussed dc planning post surgery. Pt is  hopeful to dc home with HH.  Pt just has concerns for stairs.  CM explained that we can get her transport home and into house if needed.    Pt to have IM Nailing today w/ Dr. Dallas.  Therapy evals pending.    HH ref sent via [employer]. F2F entered.    Cyndy Ramires RN, BSN  Nurse   [phone]

## 2024-12-31 NOTE — ANESTHESIA POSTPROCEDURE EVALUATION
Patient: Jackelin Mackey    Procedure Summary       Date: 12/31/24 Room / Location: [facility] MAIN OR  / [facility] MAIN OR    Anesthesia Start: 1307 Anesthesia Stop:     Procedure: Left hip nailing (Left: Hip) Diagnosis: (Left femur fracture)    Surgeons: Santo Dallas MD Anesthesiologist: Luz Rush MD    Anesthesia Type: general ASA Status: 2            Anesthesia Type: general    Vitals Value Taken Time   /73 12/31/24 1538   Temp 97.1 12/31/24 1540   Pulse 66 12/31/24 1539   Resp 16 12/31/24 1539   SpO2 99 % 12/31/24 1539   Vitals shown include unfiled device data.    [facility] AN Post Evaluation:   Patient Participation: complete - patient participated  Level of Consciousness: awake  Pain Score: 0  Pain Management: adequate  Airway Patency:patent  Yes    Nausea/Vomiting: none  Cardiovascular Status: acceptable  Respiratory Status: acceptable  Postoperative Hydration acceptable      Luz Rush MD  12/31/2024 3:40 PM

## 2025-01-01 LAB
ANION GAP SERPL CALC-SCNC: 8 MMOL/L (ref 0–18)
BASOPHILS # BLD AUTO: 0.03 X10(3) UL (ref 0–0.2)
BASOPHILS NFR BLD AUTO: 0.2 %
BUN BLD-MCNC: 16 MG/DL (ref 9–23)
BUN/CREAT SERPL: 20 (ref 10–20)
CALCIUM BLD-MCNC: 8.5 MG/DL (ref 8.7–10.4)
CHLORIDE SERPL-SCNC: 105 MMOL/L (ref 98–112)
CO2 SERPL-SCNC: 26 MMOL/L (ref 21–32)
CREAT BLD-MCNC: 0.8 MG/DL
DEPRECATED RDW RBC AUTO: 46.5 FL (ref 35.1–46.3)
EGFRCR SERPLBLD CKD-EPI 2021: 81 ML/MIN/1.73M2 (ref 60–?)
EOSINOPHIL # BLD AUTO: 0 X10(3) UL (ref 0–0.7)
EOSINOPHIL NFR BLD AUTO: 0 %
ERYTHROCYTE [DISTWIDTH] IN BLOOD BY AUTOMATED COUNT: 13.7 % (ref 11–15)
GLUCOSE BLD-MCNC: 118 MG/DL (ref 70–99)
HCT VFR BLD AUTO: 30.5 %
HGB BLD-MCNC: 10.4 G/DL
IMM GRANULOCYTES # BLD AUTO: 0.06 X10(3) UL (ref 0–1)
IMM GRANULOCYTES NFR BLD: 0.5 %
LYMPHOCYTES # BLD AUTO: 1.69 X10(3) UL (ref 1–4)
LYMPHOCYTES NFR BLD AUTO: 13.6 %
MCH RBC QN AUTO: 31.5 PG (ref 26–34)
MCHC RBC AUTO-ENTMCNC: 34.1 G/DL (ref 31–37)
MCV RBC AUTO: 92.4 FL
MONOCYTES # BLD AUTO: 1.26 X10(3) UL (ref 0.1–1)
MONOCYTES NFR BLD AUTO: 10.1 %
NEUTROPHILS # BLD AUTO: 9.38 X10 (3) UL (ref 1.5–7.7)
NEUTROPHILS # BLD AUTO: 9.38 X10(3) UL (ref 1.5–7.7)
NEUTROPHILS NFR BLD AUTO: 75.6 %
OSMOLALITY SERPL CALC.SUM OF ELEC: 290 MOSM/KG (ref 275–295)
PLATELET # BLD AUTO: 128 10(3)UL (ref 150–450)
POTASSIUM SERPL-SCNC: 4.3 MMOL/L (ref 3.5–5.1)
RBC # BLD AUTO: 3.3 X10(6)UL
SODIUM SERPL-SCNC: 139 MMOL/L (ref 136–145)
WBC # BLD AUTO: 12.4 X10(3) UL (ref 4–11)

## 2025-01-01 PROCEDURE — 99233 SBSQ HOSP IP/OBS HIGH 50: CPT | Performed by: HOSPITALIST

## 2025-01-01 RX ORDER — SODIUM CHLORIDE 9 MG/ML
INJECTION, SOLUTION INTRAVENOUS CONTINUOUS
Status: DISCONTINUED | OUTPATIENT
Start: 2025-01-01 | End: 2025-01-06

## 2025-01-01 RX ORDER — IBUPROFEN 400 MG/1
800 TABLET, FILM COATED ORAL 3 TIMES DAILY PRN
Status: DISCONTINUED | OUTPATIENT
Start: 2025-01-01 | End: 2025-01-06

## 2025-01-01 NOTE — PLAN OF CARE
Post-op day #1. Dressing in place to Left Hip. Monitoring vital signs- stable at this time. Remote tele. No acute changes noted throughout shift. Tolerating diet. Voiding by purewick. SCDs and ASA for DVT prophylaxis. Pain medication provided as needed. Encouraged frequent ambulation and use of incentive spirometer. Fall precautions maintained- bed alarm on, bed locked in lowest position, call light and personal belongings within reach, non-skid socks in place to bilateral feet. Frequent rounding by nursing staff. Plan to discharge to [facility] once medically cleared.     Problem: Patient Centered Care  Goal: Patient preferences are identified and integrated in the patient's plan of care  Description: Interventions:  - What would you like us to know as we care for you? I'm prone to nausea  - Provide timely, complete, and accurate information to patient/family  - Incorporate patient and family knowledge, values, beliefs, and cultural backgrounds into the planning and delivery of care  - Encourage patient/family to participate in care and decision-making at the level they choose  - Honor patient and family perspectives and choices  Outcome: Progressing     Problem: Patient/Family Goals  Goal: Patient/Family Long Term Goal  Description: Patient's Long Term Goal: Discharge home    Interventions:  - Manage pain, increase activity out of bed  - See additional Care Plan goals for specific interventions  Outcome: Progressing  Goal: Patient/Family Short Term Goal  Description: Patient's Short Term Goal: Not be nauseated    Interventions:   - PRN antiemetics as ordered. Dr. Downs aware  - See additional Care Plan goals for specific interventions  Outcome: Progressing     Problem: PAIN - ADULT  Goal: Verbalizes/displays adequate comfort level or patient's stated pain goal  Description: INTERVENTIONS:  - Encourage pt to monitor pain and request assistance  - Assess pain using appropriate pain scale  - Administer analgesics based on  type and severity of pain and evaluate response  - Implement non-pharmacological measures as appropriate and evaluate response  - Consider cultural and social influences on pain and pain management  - Manage/alleviate anxiety  - Utilize distraction and/or relaxation techniques  - Monitor for opioid side effects  - Notify MD/LIP if interventions unsuccessful or patient reports new pain  - Anticipate increased pain with activity and pre-medicate as appropriate  Outcome: Progressing     Problem: RISK FOR INFECTION - ADULT  Goal: Absence of fever/infection during anticipated neutropenic period  Description: INTERVENTIONS  - Monitor WBC  - Administer growth factors as ordered  - Implement neutropenic guidelines  Outcome: Progressing     Problem: SAFETY ADULT - FALL  Goal: Free from fall injury  Description: INTERVENTIONS:  - Assess pt frequently for physical needs  - Identify cognitive and physical deficits and behaviors that affect risk of falls.  - [place] fall precautions as indicated by assessment.  - Educate pt/family on patient safety including physical limitations  - Instruct pt to call for assistance with activity based on assessment  - Modify environment to reduce risk of injury  - Provide assistive devices as appropriate  - Consider OT/PT consult to assist with strengthening/mobility  - Encourage toileting schedule  Outcome: Progressing     Problem: DISCHARGE PLANNING  Goal: Discharge to home or other facility with appropriate resources  Description: INTERVENTIONS:  - Identify barriers to discharge w/pt and caregiver  - Include patient/family/discharge partner in discharge planning  - Arrange for needed discharge resources and transportation as appropriate  - Identify discharge learning needs (meds, wound care, etc)  - Arrange for interpreters to assist at discharge as needed  - Consider post-discharge preferences of patient/family/discharge partner  - Complete POLST form as appropriate  - Assess patient's  ability to be responsible for managing their own health  - Refer to Case Management Department for coordinating discharge planning if the patient needs post-hospital services based on physician/LIP order or complex needs related to functional status, cognitive ability or social support system  Outcome: Progressing     Problem: SKIN/TISSUE INTEGRITY - ADULT  Goal: Incision(s), wounds(s) or drain site(s) healing without S/S of infection  Description: INTERVENTIONS:  - Assess and document risk factors for pressure ulcer development  - Assess and document skin integrity  - Assess and document dressing/incision, wound bed, drain sites and surrounding tissue  - Implement wound care per orders  - Initiate isolation precautions as appropriate  - Initiate Pressure Ulcer prevention bundle as indicated  Outcome: Progressing     Problem: MUSCULOSKELETAL - ADULT  Goal: Return mobility to safest level of function  Description: INTERVENTIONS:  - Assess patient stability and activity tolerance for standing, transferring and ambulating w/ or w/o assistive devices  - Assist with transfers and ambulation using safe patient handling equipment as needed  - Ensure adequate protection for wounds/incisions during mobilization  - Obtain PT/OT consults as needed  - Advance activity as appropriate  - Communicate ordered activity level and limitations with patient/family  Outcome: Progressing  Goal: Maintain proper alignment of affected body part  Description: INTERVENTIONS:  - Support and protect limb and body alignment per provider's orders  - Instruct and reinforce with patient and family use of appropriate assistive device and precautions (e.g. spinal or hip dislocation precautions)  Outcome: Progressing

## 2025-01-01 NOTE — PLAN OF CARE
Significant Event - Fall Note    Date/Time of Fall: December 31, 2024 at 2100    Fall huddle completed: Yes    Description of patient fall:     Patient fell from:  While transfering from rolling chair to toliet seat     Activity when fall occurred: Ambulating with assistance or assistive devices and Transferring to or from bed, chair, etc     Where did fall occur: Bathroom     Was the fall assisted: Assisted to floor    Who witnessed the fall: Staff    Patient narrative of fall: I felt dizzy when I got out of the chair and it was all in slow motion going  to the floor    Staff narrative of fall: At approx 2100 Patient up assisted to bathroom by PCT Asmita using rolling chair. Patient transferring from rolling chair to toilet, passed out briefly and assisted to floor by PCT. This RN arrived at scene. RRT called and patient needing lift device to return to bed. Patient denies hitting head or hip and denies any additional/new pain.     Name of Provider notified of fall: Dr. Damon & Renetta SORIANO(Dr. Morton's service)    Family notification: Patient declined    Factors contributing to fall:     Physical: Dizziness     Psychological: Alert    Environmental: None     Medications received in the past 8 hours:   Medication(s) Administered in past 8 Hours from 12/31/2024 1444 to 12/31/2024 2244       Date/Time Order Dose Route Action Action by Comments    12/31/2024 1554 CST acetaminophen (Ofirmev) 10 mg/mL infusion premix 1,000 mg 1,000 mg Intravenous New Bag True Saleh RN --    12/31/2024 2200 CST ceFAZolin (Ancef) 2g in 10mL IV syringe premix 2 g Intravenous New Bag Cathy Harevy RN --    12/31/2024 1700 CST metoclopramide (Reglan) 5 mg/mL injection 5 mg 5 mg Intravenous Given True Saleh RN --    12/31/2024 1619 CST ondansetron (Zofran) 4 MG/2ML injection 4 mg 4 mg Intravenous Given True Saleh RN --    12/31/2024 2013 CST prochlorperazine (Compazine) 10 MG/2ML injection 10 mg 10 mg Intravenous Given  Cathy Harvey RN --    12/31/2024 2120 CST sodium chloride 0.9 % IV bolus 250 mL 250 mL Intravenous Bolus from Bag Gina Banuelos RN --            Was patient identified as high fall risk prior to fall: Yes                               What interventions were in place prior to fall: Assistive devices (wheelchair, commode, walker, gait belt), Bed alarm, Bed in lowest position, Call light within reach, [place] footwear, Patient/family involved in fall prevention plan, and Signage in place    Interventions post fall: Assistive devices (wheelchair, commode, walker, gait belt), Bed alarm, Bed in lowest position, Call light within reach, Fall alert wristband, [place] footwear, Patient/family involved in fall prevention plan, Personal items within reach, and Signage in place    Additional comments: XR femur done

## 2025-01-01 NOTE — PHYSICAL THERAPY NOTE
PHYSICAL THERAPY HIP EVALUATION - INPATIENT     Room Number: 436/436-A  Evaluation Date: 1/1/2025  Type of Evaluation: Initial  Physician Order: PT Eval and Treat    Presenting Problem: fall onto left side w/resultant closed displaced L femur fx s/p ORIF, WBAT  Co-Morbidities : multiple myeloma, HTN, morbid obesity, SLOAN< OA, DJD, iBS, R TKA, lumbar lami  Reason for Therapy: Mobility Dysfunction and Discharge Planning    PHYSICAL THERAPY ASSESSMENT   Patient is a 69 year old female admitted 12/30/2024 for fall when at the [employer]/tripped on a rug,  with left femur fx s/p IM nailing, WBAT.  Prior to admission, patient's baseline is mod I with use of cane at times. 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  Patient is currently functioning below baseline with bed mobility, transfers, gait, stair negotiation, maintaining seated position, standing prolonged periods, and performing household tasks.  Patient is requiring moderate assist as a result of the following impairments: decreased functional strength, decreased endurance/aerobic capacity, pain, impaired standing balance, decreased muscular endurance, medical status/ ORTHOSTATIC HYPOTENSION, and limited left hip ROM.  Physical Therapy will continue to follow for duration of hospitalization.    Patient will benefit from continued skilled PT Services to facilitate return to prior level of function as patient demonstrates high motivation with excellent tolerance to an intensive therapy program . She is normally independent and managing a household, driving.     PLAN DURING HOSPITALIZATION     PT Device Recommendation: Rolling walker  PT Treatment Plan: Bed mobility;Endurance;Patient education;Gait training;Range of motion;Strengthening;Transfer training;Balance training  Rehab Potential : Good  Frequency (Obs): Daily     PHYSICAL THERAPY MEDICAL/SOCIAL HISTORY   History related to current admission: Patient is a 69-year-old  female who tripped on a carpet and  fell, landing on her left hip, with excruciating pain. Brought into the emergency department for evaluation. CBC and chemistry were unremarkable. X-ray of the hip showed acute foreshortened and displaced fracture involving the proximal left femur shaft. EKG still pending. Patient will be admitted to the hospital for further management.      Problem List  Principal Problem:    Other closed fracture of left femur, unspecified portion of femur, initial encounter ([facility])  Active Problems:    Closed fracture of proximal end of femur ([facility])      HOME SITUATION  Type of Home: House  Home Layout: One level  Stairs to Enter : 7   Railing: Yes    Stairs to Bedroom: 0         Lives With: Spouse ( who cannot help her)    Drives: Yes   Patient Regularly Uses: Cane (uses cane as needed)     Prior Level of [place]: Patient reports living in a home with her . She is normally independent and does the driving, shopping and home mgmt. He is not able to assist her    SUBJECTIVE  \"I am afraid I'll pass out again. I just don't feel right in standing\"    PHYSICAL THERAPY EXAMINATION   OBJECTIVE  Precautions: Limb alert - left;Bed/chair alarm (LLE WBAT)  Fall Risk: High fall risk    WEIGHT BEARING RESTRICTION  L Lower Extremity: Weight Bearing as Tolerated      PAIN ASSESSMENT  Ratin  Location: left hip with weight bearing  Management Techniques: Activity promotion;Repositioning;Other (Comment) (ice, pt declines pain meds)    COGNITION  Overall Cognitive Status:  WFL - within functional limits    RANGE OF MOTION AND STRENGTH ASSESSMENT  Upper extremity ROM and strength are within functional limits   Lower extremity ROM is within functional limits for RLE, limited R hip flex/abd LLE  Lower extremity strength is within functional limits for RLE, limited R hip motion due to pain    BALANCE  Static Sitting: Good  Dynamic Sitting: Fair +  Static Standing: Poor +  Dynamic Standing: Not tested    ACTIVITY TOLERANCE  Pulse:  115  Heart Rate Source: Monitor     BP: (!) 79/53 (after standing; initial /60; initial sit EOB 92/65; RECOVERY 3 min in chair 117/70)  BP Location: Right arm  BP Method: Automatic  Patient Position: Standing    O2 WALK  Oxygen Therapy  SPO2% on Room Air at Rest: 99  SPO2% Ambulation on Room Air: 94    AM-PAC '6-Clicks' INPATIENT SHORT FORM - BASIC MOBILITY  How much difficulty does the patient currently have...  Patient Difficulty: Turning over in bed (including adjusting bedclothes, sheets and blankets)?: A Lot   Patient Difficulty: Sitting down on and standing up from a chair with arms (e.g., wheelchair, bedside commode, etc.): A Lot   Patient Difficulty: Moving from lying on back to sitting on the side of the bed?: A Lot   How much help from another person does the patient currently need...   Help from Another: Moving to and from a bed to a chair (including a wheelchair)?: A Lot   Help from Another: Need to walk in hospital room?: Total   Help from Another: Climbing 3-5 steps with a railing?: Total     AM-PAC Score:  Raw Score: 10   Approx Degree of Impairment: 76.75%   Standardized Score (AM-PAC Scale): 32.29   CMS Modifier (G-Code): CL    FUNCTIONAL ABILITY STATUS  Functional Mobility/Gait Assessment  Gait Assistance: Not tested (pt feeling lightheaded w/positive orthostatics)  Rolling: minimal assist  Supine to Sit: minimal assist  Sit to Supine: moderate assist  Sit to Stand: maximum assist    Exercise/Education Provided:  Bed mobility  Functional activity tolerated  Posture  ROM  Strengthening  Transfer training    Skilled Therapy Provided: BENITO Santiago approves participation in therapy session. Patient had an episode of lightheadedness when getting up with RN staff and needed lift back to bed from rolling chair, still orthostatic today and RN aware, working with MD. Patient presents in bed and is agreeable to therapy. She is educated in AP, QS, GS and heel slides but needs assist for AAROM to LLE  including hip abd/add. She is able to perform LAQ with assist at EOB. She reports dizziness with all position changes but BP stabilizes with increased time. She is able to take steps to chair but then feels sweaty so legs elevated and pt feels better and BP better within 3 min. She is motivated to get better, needs increased time for all mobility and position changes due to drops in BP. She will benefit from ongoing therapy, pt aware to continue to perform LE exs and deep breathing as well as call for assist from bed to chair with staff and RW. She is up in chair with all needs in reach, SCD's in place and RN aware of session.     The patient's Approx Degree of Impairment: 76.75% has been calculated based on documentation in the [facility] '6 clicks' Inpatient Basic Mobility Short Form.  Research supports that patients with this level of impairment may benefit from LTC.  HOWEVER session was limited by orthostatic hypotension. Anticipate pt will progress well once this is managed and she will be able to participate in 45 min sessions and go to intensive rehab/acute rehab at OR to maximize return to plf.  Final disposition will be made by interdisciplinary medical team.    Patient End of Session: Up in chair;Needs met;Call light within reach;RN aware of session/findings;All patient questions and concerns addressed;Hospital anti-slip socks;SCDs in place;Ice applied;Alarm set    CURRENT GOALS  Goals to be met by: 1/10/25  Patient Goal Patient's self-stated goal is: to get better and be independent   Goal #1 Patient is able to demonstrate supine - sit EOB @ level: minimal assistance   Goal #1   Current Status    Goal #2 Patient is able to demonstrate transfers Sit to/from Stand at assistance level: minimal assistance with RW     Goal #2  Current Status    Goal #3 Patient is able to ambulate 50 feet with RW at assistance level: minimal assistance   Goal #3   Current Status    Goal #4 Patient will negotiate bed to/from chair  transfers with a RW with minimal assistance   Goal #4   Current Status    Goal #5 Patient verbalizes and/or demonstrates all precautions and safety concerns independently   Goal #5   Current Status    Goal #6 Patient independently performs home exercise program for ROM/strengthening per the instructions provided in preparation for discharge.   Goal #6  Current Status      Patient Evaluation Complexity Level:  History High - 3 or more personal factors and/or co-morbidities   Examination of body systems Moderate - addressing a total of 3 or more elements   Clinical Presentation  Moderate - Evolving   Clinical Decision Making  Moderate Complexity     Therapeutic Activity:  33 minutes  Neuromuscular Re-education: 23 minutes

## 2025-01-01 NOTE — PROGRESS NOTES
[facility]  part of [facility]    Progress Note    Jackelin Mackey Patient Status:  Inpatient    1955 MRN [id number]   Location [facility] 4W/SW/SE Attending Sun Downs, DO   Hosp Day # 1 PCP Colby Pandya MD, MD     Chief complaint hip pain     Subjective:   Jackelin Mackey is a(n) 69 year old female pt seen before surgery. Pain controlled. Nausea improved     ROS:   No cp, sob   No c/d   No n/v     Objective:   Blood pressure 111/68, pulse 68, temperature 98.4 °F (36.9 °C), temperature source Oral, resp. rate 16, height 5' (1.524 m), weight 233 lb (105.7 kg), SpO2 95%.      Intake/Output Summary (Last 24 hours) at 2024 1840  Last data filed at 2024 1517  Gross per 24 hour   Intake 1000 ml   Output 900 ml   Net 100 ml       Patient Weight(s) for the past 336 hrs:   Weight   24 1421 233 lb (105.7 kg)   24 1041 233 lb (105.7 kg)           General appearance: alert, appears stated age and cooperative  Pulmonary:  clear to auscultation bilaterally  Cardiovascular: S1, S2 normal, no murmur, click, rub or gallop, regular rate and rhythm  Abdominal: soft, non-tender; bowel sounds normal; no masses,  no organomegaly  Extremities: extremities normal, atraumatic, no cyanosis or edema        Medicines:     Current Facility-Administered Medications   Medication Dose Route Frequency    HYDROmorphone (Dilaudid) 1 MG/ML injection 0.4 mg  0.4 mg Intravenous Q2H PRN    sodium chloride 0.9 % IV bolus 500 mL  500 mL Intravenous Once PRN    sennosides (Senokot) tab 17.2 mg  17.2 mg Oral Nightly    docusate sodium (Colace) cap 100 mg  100 mg Oral BID    polyethylene glycol (PEG 3350) (Miralax) 17 g oral packet 17 g  17 g Oral Daily PRN    magnesium hydroxide (Milk of Magnesia) 400 MG/5ML oral suspension 30 mL  30 mL Oral Daily PRN    bisacodyl (Dulcolax) 10 MG rectal suppository 10 mg  10 mg Rectal Daily PRN    fleet enema (Fleet) rectal enema 133 mL  1  enema Rectal Once PRN    ondansetron (Zofran) 4 MG/2ML injection 4 mg  4 mg Intravenous Q6H PRN    metoclopramide (Reglan) 5 mg/mL injection 5 mg  5 mg Intravenous Q8H PRN    diphenhydrAMINE (Benadryl) 50 mg/mL  injection 25 mg  25 mg Intravenous Once PRN    diphenhydrAMINE (Benadryl) cap/tab 25 mg  25 mg Oral Q4H PRN    Or    diphenhydrAMINE (Benadryl) 50 mg/mL  injection 12.5 mg  12.5 mg Intravenous Q4H PRN    aspirin DR tab 81 mg  81 mg Oral BID    ceFAZolin (Ancef) 2g in 10mL IV syringe premix  2 g Intravenous Q8H    HYDROcodone-acetaminophen (Norco)  MG per tab 1 tablet  1 tablet Oral Q6H PRN    acetaminophen (Tylenol Extra Strength) tab 500 mg  500 mg Oral Q4H PRN    temazepam (Restoril) cap 15 mg  15 mg Oral Nightly PRN    calcium acetate (Phoslo) cap 667 mg  1 capsule Oral BID    cetirizine (ZyrTEC) tab 10 mg  10 mg Oral Daily    cyclobenzaprine (Flexeril) tab 5 mg  5 mg Oral TID PRN    hydrALAzine (Apresoline) 20 mg/mL injection 10 mg  10 mg Intravenous Q4H PRN    [Transfer Hold] ondansetron (Zofran) 4 MG/2ML injection 4 mg  4 mg Intravenous Q4H PRN           Ant-Infective Medications            acyclovir 400 MG Oral Tab                            Lab Results   Component Value Date    WBC 5.8 12/31/2024    HGB 13.2 12/31/2024    HCT 39.5 12/31/2024    .0 12/31/2024    CREATSERUM 0.79 12/31/2024    BUN 11 12/31/2024     12/31/2024    K 4.3 12/31/2024     12/31/2024    CO2 27.0 12/31/2024     (H) 12/31/2024    CA 9.1 12/31/2024    ALB 4.0 10/27/2020    ALKPHO 62 10/27/2020    BILT 0.54 10/27/2020    TP 6.4 10/27/2020    AST 21 10/27/2020    ALT 21 10/27/2020       XR FEMUR MIN 2 VIEWS LEFT (CPT=73552)    Result Date: 12/31/2024  CONCLUSION: Post ORIF for proximal left femoral diaphysis fracture.  Expected postprocedural changes are present.   Dictated by (CST): Paddy Tapia MD on 12/31/2024 at 4:29 PM     Finalized by (CST): Paddy Tapia MD on 12/31/2024 at 4:31 PM          XR  FLUOROSCOPY C-ARM TIME LESS THAN 1 HOUR (CPT=76000)    Result Date: 12/31/2024  CONCLUSION: Fluoroscopic guidance as above.  227.8-seconds of fluoroscopy time were used. 3 fluoroscopic images as well as a 1 page-dose summary image are stored with this exam.  RADIATION DOSE (Dose Area Product):  2.09-mGy*m^2.  Dictated by (CST): Paddy Tapia MD on 12/31/2024 at 3:51 PM     Finalized by (CST): Paddy Tapia MD on 12/31/2024 at 3:52 PM          XR HIP W OR WO PELVIS 2 OR 3 VIEWS, LEFT (CPT=73502)    Result Date: 12/30/2024  CONCLUSION:   Acute, foreshortened, and displaced fracture involving the proximal left femoral shaft.  Recommend orthopedic surgery consultation.  No dislocation.     Dictated by (CST): Luis Chirinos MD on 12/30/2024 at 11:47 AM     Finalized by (CST): Luis Chirinos MD on 12/30/2024 at 11:51 AM         EKG 12 Lead    Result Date: 12/31/2024  Normal sinus rhythm Normal ECG No previous ECGs found in [facility] Confirmed by JAYLON PANIAGUA ([id number]) on 12/31/2024 1:10:03 PM     Results:     CBC:    Lab Results   Component Value Date    WBC 5.8 12/31/2024    WBC 5.6 12/30/2024    WBC 2.79 (L) 10/27/2020     Lab Results   Component Value Date    HGB 13.2 12/31/2024    HGB 14.7 12/30/2024    HGB 13.6 10/27/2020      Lab Results   Component Value Date    .0 12/31/2024    .0 (L) 12/30/2024     (L) 10/27/2020       Recent Labs   Lab 12/30/24  1041 12/30/24  1046 12/31/24  0643   *  --  109*   BUN 12  --  11   CREATSERUM 0.76  --  0.79   CA 9.8  --  9.1     --  139   K 3.6 Negative 4.3     --  104   CO2 24.0  --  27.0           Assessment and Plan:        ASSESSMENT:    1.       Acute displaced left proximal femur shaft fracture after a mechanical fall.  - EKG reviewed. Pt tolerated surgery well   2.       Morbid obesity.  3.       Obstructive sleep apnea.  4.       Essential hypertension.  5.       Multiple myeloma, status post autologous stem cell transplant, currently in  remission.      Dvt ppx per ASA          Chart reviewed, including current vitals, notes, labs and imaging  Labs ordered and medications adjusted as outlined above  Coordinate care with care team/consultants  Discussed with patient results of tests, management plan as outlined above, and the need for ongoing hospitalization  D/w RN     [facility]        12/31/2024     **Certification      PHYSICIAN Certification of Need for Inpatient Hospitalization - Initial Certification    Patient will require inpatient services that will reasonably be expected to span two midnight's based on the clinical documentation in H+P.   Based on patients current state of illness, I anticipate that, after discharge, patient will require TBD.        Supplementary Documentation:   DVT Mechanical Prophylaxis:   SCDs, Early ambuation  DVT Pharmacologic Prophylaxis   Medication   None         DVT Pharmacologic prophylaxis: Aspirin 81 mg      Code Status: Not on file  Oseguera: No urinary catheter in place  Oseguera Duration (in days):   Central line:    ERICKSON: 1/2/2025        **Certification      PHYSICIAN Certification of Need for Inpatient Hospitalization - Initial Certification    Patient will require inpatient services that will reasonably be expected to span two midnight's based on the clinical documentation in H+P.   Based on patients current state of illness, I anticipate that, after discharge, patient will require TBD.

## 2025-01-01 NOTE — PROGRESS NOTES
[facility]  part of [facility]    Progress Note    Jackelin Mackey Patient Status:  Inpatient    1955 MRN [id number]   Location [facility] 4W/SW/SE Attending Sun Downs, DO   Hosp Day # 2 PCP Colby Pandya MD, MD     SUBJECTIVE:  Interval History: Patient has no current complaints.  Pain tolerable but increases with movement.  Patient denies chest pain, shortness of breath and calf pain. Did have dizzy spell last pm, lowered to floor, RRT called, xr taken. Was a bit dizzy up out of bed to chair this am    Post-Op Day: 1    OBJECTIVE:  Blood pressure 110/84, pulse 98, temperature 99.2 °F (37.3 °C), temperature source Oral, resp. rate 18, height 5' (1.524 m), weight 233 lb (105.7 kg), SpO2 92%.     A&O x 3, NAD, No SOB  Pt sitting up in chair, legs elevated, SCD's in place      Recent Results (from the past 24 hours)   Basic Metabolic Panel (8)    Collection Time: 25  4:41 AM   Result Value Ref Range    Glucose 118 (H) 70 - 99 mg/dL    Sodium 139 136 - 145 mmol/L    Potassium 4.3 3.5 - 5.1 mmol/L    Chloride 105 98 - 112 mmol/L    CO2 26.0 21.0 - 32.0 mmol/L    Anion Gap 8 0 - 18 mmol/L    BUN 16 9 - 23 mg/dL    Creatinine 0.80 0.55 - 1.02 mg/dL    BUN/CREA Ratio 20.0 10.0 - 20.0    Calcium, Total 8.5 (L) 8.7 - 10.4 mg/dL    Calculated Osmolality 290 275 - 295 mOsm/kg    eGFR-Cr 81 >=60 mL/min/1.73m2   CBC With Differential With Platelet    Collection Time: 25  4:41 AM   Result Value Ref Range    WBC 12.4 (H) 4.0 - 11.0 x10(3) uL    RBC 3.30 (L) 3.80 - 5.30 x10(6)uL    HGB 10.4 (L) 12.0 - 16.0 g/dL    HCT 30.5 (L) 35.0 - 48.0 %    MCV 92.4 80.0 - 100.0 fL    MCH 31.5 26.0 - 34.0 pg    MCHC 34.1 31.0 - 37.0 g/dL    RDW-SD 46.5 (H) 35.1 - 46.3 fL    RDW 13.7 11.0 - 15.0 %    .0 (L) 150.0 - 450.0 10(3)uL    Neutrophil Absolute Prelim 9.38 (H) 1.50 - 7.70 x10 (3) uL    Neutrophil Absolute 9.38 (H) 1.50 - 7.70 x10(3) uL    Lymphocyte Absolute 1.69 1.00 -  4.00 x10(3) uL    Monocyte Absolute 1.26 (H) 0.10 - 1.00 x10(3) uL    Eosinophil Absolute 0.00 0.00 - 0.70 x10(3) uL    Basophil Absolute 0.03 0.00 - 0.20 x10(3) uL    Immature Granulocyte Absolute 0.06 0.00 - 1.00 x10(3) uL    Neutrophil % 75.6 %    Lymphocyte % 13.6 %    Monocyte % 10.1 %    Eosinophil % 0.0 %    Basophil % 0.2 %    Immature Granulocyte % 0.5 %        XR FEMUR MIN 2 VIEWS LEFT (CPT=73552)    Result Date: 1/1/2025  CONCLUSION:   No new fracture or evidence of hardware complication.  Proximal femoral comminuted fracture is similar in appearance to the prior exam status post intramedullary nail placement.        Preliminary report was given by [facility].  There are no clinically significant discrepancies.    elm-remote       Dictated by (CST): Chapin Mcdermott MD on 1/01/2025 at 6:24 AM     Finalized by (CST): Chapin Mcdermott MD on 1/01/2025 at 6:25 AM          XR FEMUR MIN 2 VIEWS LEFT (CPT=73552)    Result Date: 12/31/2024  CONCLUSION: Post ORIF for proximal left femoral diaphysis fracture.  Expected postprocedural changes are present.   Dictated by (CST): Paddy Tapia MD on 12/31/2024 at 4:29 PM     Finalized by (CST): Paddy Tapia MD on 12/31/2024 at 4:31 PM          XR FLUOROSCOPY C-ARM TIME LESS THAN 1 HOUR (CPT=76000)    Result Date: 12/31/2024  CONCLUSION: Fluoroscopic guidance as above.  227.8-seconds of fluoroscopy time were used. 3 fluoroscopic images as well as a 1 page-dose summary image are stored with this exam.  RADIATION DOSE (Dose Area Product):  2.09-mGy*m^2.  Dictated by (CST): Paddy Tapia MD on 12/31/2024 at 3:51 PM     Finalized by (CST): Paddy Tapia MD on 12/31/2024 at 3:52 PM              Ortho Exam:  Dressing clean and dry.  No erythema/drainage.  Pulses 2+.   Sensation is intact.  + DF/PF/EHL.  Calf is soft and nontender. Negative Chi's test.          ASSESSMENT/PLAN:    S/P Left hip long IM nail  1) Continue pain control-Tylenol v norco, pt will consider IBU, order  placed  2) Continue DVT prophylaxis with  81mg asa bid x 6 wks  3) Continue PT/OT  4)d/c planning to rehab when arranged, appreciate SW input  Renetta Pritchett PA-C  1/1/2025  12:24 PM

## 2025-01-01 NOTE — PLAN OF CARE
Problem: Patient Centered Care  Goal: Patient preferences are identified and integrated in the patient's plan of care  Description: Interventions:  - What would you like us to know as we care for you? I'm prone to nausea  - Provide timely, complete, and accurate information to patient/family  - Incorporate patient and family knowledge, values, beliefs, and cultural backgrounds into the planning and delivery of care  - Encourage patient/family to participate in care and decision-making at the level they choose  - Honor patient and family perspectives and choices  Outcome: Progressing     Problem: Patient/Family Goals  Goal: Patient/Family Long Term Goal  Description: Patient's Long Term Goal: Discharge home    Interventions:  - Manage pain, increase activity out of bed  - See additional Care Plan goals for specific interventions  Outcome: Progressing  Goal: Patient/Family Short Term Goal  Description: Patient's Short Term Goal: Not be nauseated    Interventions:   - PRN antiemetics as ordered. Dr. Downs aware  - See additional Care Plan goals for specific interventions  Outcome: Progressing     Problem: PAIN - ADULT  Goal: Verbalizes/displays adequate comfort level or patient's stated pain goal  Description: INTERVENTIONS:  - Encourage pt to monitor pain and request assistance  - Assess pain using appropriate pain scale  - Administer analgesics based on type and severity of pain and evaluate response  - Implement non-pharmacological measures as appropriate and evaluate response  - Consider cultural and social influences on pain and pain management  - Manage/alleviate anxiety  - Utilize distraction and/or relaxation techniques  - Monitor for opioid side effects  - Notify MD/LIP if interventions unsuccessful or patient reports new pain  - Anticipate increased pain with activity and pre-medicate as appropriate  Outcome: Progressing  Note: Jackelin states left hip pain manageable at this time. She is aware PRN pain  meds are available. Using ice packs to left hip area with relief     Problem: RISK FOR INFECTION - ADULT  Goal: Absence of fever/infection during anticipated neutropenic period  Description: INTERVENTIONS  - Monitor WBC  - Administer growth factors as ordered  - Implement neutropenic guidelines  Outcome: Progressing  Note: Afebrile. Last WBC 5.8. CBC ordered for am. Remains on IV Cefazolin ordered for surgical prophylaxis     Problem: SAFETY ADULT - FALL  Goal: Free from fall injury  Description: INTERVENTIONS:  - Assess pt frequently for physical needs  - Identify cognitive and physical deficits and behaviors that affect risk of falls.  - [place] fall precautions as indicated by assessment.  - Educate pt/family on patient safety including physical limitations  - Instruct pt to call for assistance with activity based on assessment  - Modify environment to reduce risk of injury  - Provide assistive devices as appropriate  - Consider OT/PT consult to assist with strengthening/mobility  - Encourage toileting schedule  Outcome: Progressing  Flowsheets (Taken 1/1/2025 0208)  Additional Interventions for High Risk:   Call Light Return  Demonstration   Bed Alarm/I-bed awareness   Use of ambulation devices (examples: walker, cane, crutches)   Use of patient safe handling equipment   Nighlight on and door open     Problem: DISCHARGE PLANNING  Goal: Discharge to home or other facility with appropriate resources  Description: INTERVENTIONS:  - Identify barriers to discharge w/pt and caregiver  - Include patient/family/discharge partner in discharge planning  - Arrange for needed discharge resources and transportation as appropriate  - Identify discharge learning needs (meds, wound care, etc)  - Arrange for interpreters to assist at discharge as needed  - Consider post-discharge preferences of patient/family/discharge partner  - Complete POLST form as appropriate  - Assess patient's ability to be responsible for managing  their own health  - Refer to Case Management Department for coordinating discharge planning if the patient needs post-hospital services based on physician/LIP order or complex needs related to functional status, cognitive ability or social support system  Outcome: Progressing  Note: DC plan TBD     Problem: SKIN/TISSUE INTEGRITY - ADULT  Goal: Incision(s), wounds(s) or drain site(s) healing without S/S of infection  Description: INTERVENTIONS:  - Assess and document risk factors for pressure ulcer development  - Assess and document skin integrity  - Assess and document dressing/incision, wound bed, drain sites and surrounding tissue  - Implement wound care per orders  - Initiate isolation precautions as appropriate  - Initiate Pressure Ulcer prevention bundle as indicated  Outcome: Progressing  Note: Left hip/thigh dressing clean dry & intact     Problem: MUSCULOSKELETAL - ADULT  Goal: Return mobility to safest level of function  Description: INTERVENTIONS:  - Assess patient stability and activity tolerance for standing, transferring and ambulating w/ or w/o assistive devices  - Assist with transfers and ambulation using safe patient handling equipment as needed  - Ensure adequate protection for wounds/incisions during mobilization  - Obtain PT/OT consults as needed  - Advance activity as appropriate  - Communicate ordered activity level and limitations with patient/family  Outcome: Progressing  Note: 2 person assist at this time using rolling chair and walker.  Goal: Maintain proper alignment of affected body part  Description: INTERVENTIONS:  - Support and protect limb and body alignment per provider's orders  - Instruct and reinforce with patient and family use of appropriate assistive device and precautions (e.g. spinal or hip dislocation precautions)  Outcome: Progressing

## 2025-01-01 NOTE — OCCUPATIONAL THERAPY NOTE
OCCUPATIONAL THERAPY EVALUATION - INPATIENT     Room Number: 436/436-A  Evaluation Date: 1/1/2025  Type of Evaluation: Initial  Presenting Problem: fall in community, underwent IMN LLE    Physician Order: IP Consult to Occupational Therapy  Reason for Therapy: ADL/IADL Dysfunction and Discharge Planning    OCCUPATIONAL THERAPY ASSESSMENT   Patient is a 69 year old female admitted 12/30/2024 for fall resulting in LLE IMN-WBAT.  Prior to admission, patient's baseline is mod I for functional mobility (cane PRN), independent ADLS.  Patient is currently functioning below baseline with ADLs and functional mobility/TFRs. Pt is greatly impacted on eval by (+) orthostatic HTN and is symptomatic. Prior to OT eval, Pt experienced syncopal episode w hospital staff in bathroom.   Patient is requiring up to mod A x2 for functional TFRs, standing balance; Up to total A for LB ADLs as a result of the following impairments: decreased functional strength, decreased functional reach, decreased endurance, pain, impaired   balance, decreased muscular endurance, medical status, and limited   ROM. Occupational Therapy will continue to follow for duration of hospitalization.    Patient will benefit from continued skilled OT Services to promote return to prior level of function and safety with continuous assistance and gradual rehabilitative therapy.    PLAN DURING HOSPITALIZATION  OT Device Recommendations: TBD  OT Treatment Plan: Balance activities;Energy conservation/work simplification techniques;ADL training;Functional transfer training;Endurance training;Patient/Family education;Patient/Family training     OCCUPATIONAL THERAPY MEDICAL/SOCIAL HISTORY   Problem List   Principal Problem:    Other closed fracture of left femur, unspecified portion of femur, initial encounter ([facility])  Active Problems:    Closed fracture of proximal end of femur ([facility])    HOME SITUATION  Type of Home: House  Home Layout: Other (Comment) (7 in, L rail)  Lives  With: Spouse  Toilet and Equipment: Standard height toilet  Shower/Tub and Equipment: Tub-shower combo  Other Equipment: Reacher; Long-handled shoehorn; Sock aid; Other (Comment) (SPC-uses PRN)  Drives: Yes  Patient Regularly Uses: Cane (cane PRN)    Pt reports  is limited in abilities to provide PA at DC.     SUBJECTIVE  \"I dont think I can meet my needs myself at home\"    OCCUPATIONAL THERAPY EXAMINATION   OBJECTIVE  Precautions: Bed/chair alarm  Fall Risk: High fall risk    WEIGHT BEARING RESTRICTION  L Lower Extremity: Weight Bearing as Tolerated    PAIN ASSESSMENT  Ratin  Location: L hip  Management Techniques: Ice; Nurse notified; Repositioning; Activity promotion; Body mechanics; Breathing techniques    ACTIVITY TOLERANCE  Pulse: 105  Heart Rate Source: Monitor     BP supine: 116/60 asymptomatic  BP at EOB: 1. 92/65; 2. (~3 mins) 98/70 . Nauseated mild lightheaded.   BP post stand pivot:  79/53. Diaphoretic.   BP in chair reclined ~5 mins: 117/70, symptoms gradually resolving. RN made aware    BP Location: Right arm  BP Method: Automatic      O2 SATURATIONS  Oxygen Therapy  SPO2% on Room Air at Rest: 99  SPO2% on Oxygen at Rest: 0    COGNITION  Overall Cognitive Status:  WFL - within functional limits    RANGE OF MOTION   Upper extremity ROM is within functional limits     STRENGTH ASSESSMENT  Upper extremity strength is within functional limits     ACTIVITIES OF DAILY LIVING ASSESSMENT  AM-PAC ‘6-Clicks’ Inpatient Daily Activity Short Form  How much help from another person does the patient currently need…  -   Putting on and taking off regular lower body clothing?: A Lot  -   Bathing (including washing, rinsing, drying)?: A Lot  -   Toileting, which includes using toilet, bedpan or urinal? : A Lot  -   Putting on and taking off regular upper body clothing?: A Little  -   Taking care of personal grooming such as brushing teeth?: A Little  -   Eating meals?: None    AM-PAC Score:  Score: 16  Approx  Degree of Impairment: 53.32%  Standardized Score (AM-PAC Scale): 35.96  CMS Modifier (G-Code): CK    BED MOBILITY  Supine to Sit: mod assist x2. Head of bed elevated, use of rails.   Scoot to EOB: Mod A.     FUNCTIONAL TRANSFER ASSESSMENT  Sit to Stand from EOB: mod assist x2- 2ww, elevated surface  Stand Pivot Transfer from EOB to Chair: mod assist x2, 2ww, pivot to R side, (+) orthostatic    FUNCTIONAL MOBILITY  Comments:     Not appropriate at this time    ACTIVITIES OF DAILY LIVING  Eating: independent  Grooming: setup assist-seated  UB Dressing: setup assist-seated  LB Dressing: total assist  Toileting: total assist  Comments:     Graded based on abilities during session and clinical judgement. Pt demos abilities with:  LB dressing, clothing management    Skilled Therapy Provided: Pt seen for OT session in coordination w PT for overall safety to promote functional gains . See ADL and mobility grids, Dc recs for details. At the end, Pt positioned semi-reclined in chair w BLE elevated, Scds donned, ice to L hip, RN aware.       EDUCATION PROVIDED  Patient Education : Role of Occupational Therapy; Plan of Care; Discharge Recommendations; DME Recommendations; Functional Transfer Techniques; Fall Prevention; Weight Bear Status; Posture/Positioning; Edema Reduction; Energy Conservation; Proper Body Mechanics  Patient's Response to Education: Verbalized Understanding; Returned Demonstration    The patient's Approx Degree of Impairment: 53.32% has been calculated based on documentation in the [facility] '6 clicks' Inpatient Daily Activity Short Form.  Research supports that patients with this level of impairment may benefit from rehab.  Final disposition will be made by interdisciplinary medical team.    Patient End of Session: Up in chair;Needs met;Call light within reach;RN aware of session/findings;All patient questions and concerns addressed;Hospital anti-slip socks;Ice applied;Alarm set    OT Goals  Patient self-stated  goal is: to walk and be able to return home     Patient will complete LE dressing with min A, AE PRN  Comment:     Patient will complete functional TFR with min A  Comment:     Patient will inc stand balance during ADL task to min A   Comment:     Goals  on: 1/15/25  Frequency: 3-5xs/week    Patient Evaluation Complexity Level:   Occupational Profile/Medical History LOW - Brief history including review of medical or therapy records    Specific performance deficits impacting engagement in ADL/IADL MODERATE  3 - 5 performance deficits   Client Assessment/Performance Deficits LOW - No comorbidities nor modifications of tasks    Clinical Decision Making MODERATE - Analysis of occupational profile, detailed assessments, several treatment options    Overall Complexity LOW     OT Session Time: 24 minutes  Therapeutic Activity: 24 minutes

## 2025-01-01 NOTE — CM/SW NOTE
CM received STAT MDO for rehab placement.    Per chart review, PT/OT evaluations pending at this time.    CM sent tentative [facility] referrals [facility] [employer]  PASRR completed and uploaded to referral.    / to remain available for support and/or discharge planning.     Plan: TBD, pending PT/OT, clinical course -  and [facility] referrals pending    Amanda Roe RN, BSN  Nurse   [phone]

## 2025-01-01 NOTE — PROGRESS NOTES
[facility]  part of [facility]    Progress Note    Jackelin Mackey Patient Status:  Inpatient    1955 MRN [id number]   Location [facility] 4W/SW/SE Attending Sun Downs, DO   Hosp Day # 2 PCP Colby Pandya MD, MD     Chief complaint hip pain     Subjective:   Jackelin Mackey is a(n) 69 year old female Pt doing well. No c/o's     ROS:   No cp, sob   No c/d   No n/v     Objective:   Blood pressure 110/84, pulse 105, temperature 99.2 °F (37.3 °C), temperature source Oral, resp. rate 18, height 5' (1.524 m), weight 233 lb (105.7 kg), SpO2 92%.      Intake/Output Summary (Last 24 hours) at 2025 1553  Last data filed at 2025 0900  Gross per 24 hour   Intake 1085.83 ml   Output 600 ml   Net 485.83 ml       Patient Weight(s) for the past 336 hrs:   Weight   24 1421 233 lb (105.7 kg)   24 1041 233 lb (105.7 kg)           General appearance: alert, appears stated age and cooperative  Pulmonary:  clear to auscultation bilaterally  Cardiovascular: S1, S2 normal, no murmur, click, rub or gallop, regular rate and rhythm  Abdominal: soft, non-tender; bowel sounds normal; no masses,  no organomegaly  Extremities: extremities normal, atraumatic, no cyanosis or edema, wound c/d/I         Medicines:     Current Facility-Administered Medications   Medication Dose Route Frequency    sodium chloride 0.9% infusion   Intravenous Continuous    ibuprofen (Motrin) tab 800 mg  800 mg Oral TID PRN    HYDROmorphone (Dilaudid) 1 MG/ML injection 0.4 mg  0.4 mg Intravenous Q2H PRN    sodium chloride 0.9 % IV bolus 500 mL  500 mL Intravenous Once PRN    sennosides (Senokot) tab 17.2 mg  17.2 mg Oral Nightly    docusate sodium (Colace) cap 100 mg  100 mg Oral BID    polyethylene glycol (PEG 3350) (Miralax) 17 g oral packet 17 g  17 g Oral Daily PRN    magnesium hydroxide (Milk of Magnesia) 400 MG/5ML oral suspension 30 mL  30 mL Oral Daily PRN    bisacodyl (Dulcolax) 10 MG  rectal suppository 10 mg  10 mg Rectal Daily PRN    fleet enema (Fleet) rectal enema 133 mL  1 enema Rectal Once PRN    metoclopramide (Reglan) 5 mg/mL injection 5 mg  5 mg Intravenous Q8H PRN    diphenhydrAMINE (Benadryl) cap/tab 25 mg  25 mg Oral Q4H PRN    Or    diphenhydrAMINE (Benadryl) 50 mg/mL  injection 12.5 mg  12.5 mg Intravenous Q4H PRN    aspirin DR tab 81 mg  81 mg Oral BID    HYDROcodone-acetaminophen (Norco)  MG per tab 1 tablet  1 tablet Oral Q6H PRN    ondansetron (Zofran) 4 MG/2ML injection 4 mg  4 mg Intravenous Q4H PRN    acetaminophen (Tylenol Extra Strength) tab 500 mg  500 mg Oral Q4H PRN    temazepam (Restoril) cap 15 mg  15 mg Oral Nightly PRN    calcium acetate (Phoslo) cap 667 mg  1 capsule Oral BID    cetirizine (ZyrTEC) tab 10 mg  10 mg Oral Daily    cyclobenzaprine (Flexeril) tab 5 mg  5 mg Oral TID PRN    hydrALAzine (Apresoline) 20 mg/mL injection 10 mg  10 mg Intravenous Q4H PRN           Ant-Infective Medications            acyclovir 400 MG Oral Tab                     sodium chloride 100 mL/hr at 01/01/25 1106           Lab Results   Component Value Date    WBC 12.4 (H) 01/01/2025    HGB 10.4 (L) 01/01/2025    HCT 30.5 (L) 01/01/2025    .0 (L) 01/01/2025    CREATSERUM 0.80 01/01/2025    BUN 16 01/01/2025     01/01/2025    K 4.3 01/01/2025     01/01/2025    CO2 26.0 01/01/2025     (H) 01/01/2025    CA 8.5 (L) 01/01/2025    ALB 4.0 10/27/2020    ALKPHO 62 10/27/2020    BILT 0.54 10/27/2020    TP 6.4 10/27/2020    AST 21 10/27/2020    ALT 21 10/27/2020       XR FEMUR MIN 2 VIEWS LEFT (CPT=73552)    Result Date: 1/1/2025  CONCLUSION:   No new fracture or evidence of hardware complication.  Proximal femoral comminuted fracture is similar in appearance to the prior exam status post intramedullary nail placement.        Preliminary report was given by [facility].  There are no clinically significant discrepancies.    elm-remote       Dictated by  (CST): Chapin Mcdermott MD on 1/01/2025 at 6:24 AM     Finalized by (CST): Chapin Mcdermott MD on 1/01/2025 at 6:25 AM          XR FEMUR MIN 2 VIEWS LEFT (CPT=73552)    Result Date: 12/31/2024  CONCLUSION: Post ORIF for proximal left femoral diaphysis fracture.  Expected postprocedural changes are present.   Dictated by (CST): Paddy aTpia MD on 12/31/2024 at 4:29 PM     Finalized by (CST): Paddy Tapia MD on 12/31/2024 at 4:31 PM          XR FLUOROSCOPY C-ARM TIME LESS THAN 1 HOUR (CPT=76000)    Result Date: 12/31/2024  CONCLUSION: Fluoroscopic guidance as above.  227.8-seconds of fluoroscopy time were used. 3 fluoroscopic images as well as a 1 page-dose summary image are stored with this exam.  RADIATION DOSE (Dose Area Product):  2.09-mGy*m^2.  Dictated by (CST): Paddy Tapia MD on 12/31/2024 at 3:51 PM     Finalized by (CST): Paddy Tapia MD on 12/31/2024 at 3:52 PM               Results:     CBC:    Lab Results   Component Value Date    WBC 12.4 (H) 01/01/2025    WBC 5.8 12/31/2024    WBC 5.6 12/30/2024     Lab Results   Component Value Date    HGB 10.4 (L) 01/01/2025    HGB 13.2 12/31/2024    HGB 14.7 12/30/2024      Lab Results   Component Value Date    .0 (L) 01/01/2025    .0 12/31/2024    .0 (L) 12/30/2024       Recent Labs   Lab 12/30/24  1041 12/30/24  1046 12/31/24  0643 01/01/25  0441   *  --  109* 118*   BUN 12  --  11 16   CREATSERUM 0.76  --  0.79 0.80   CA 9.8  --  9.1 8.5*     --  139 139   K 3.6 Negative 4.3 4.3     --  104 105   CO2 24.0  --  27.0 26.0           Assessment and Plan:        ASSESSMENT:    1.       Acute displaced left proximal femur shaft fracture after a mechanical fall.  - EKG reviewed. Pt tolerated surgery well   - pod #1   - pt/ot will need rehab   2.       Morbid obesity.  3.       Obstructive sleep apnea.  4.       Essential hypertension.  5.       Multiple myeloma, status post autologous stem cell transplant  -  holding pt's pomalidomide .  She left message for her onc team at [facility]      Dvt ppx per ASA          Chart reviewed, including current vitals, notes, labs and imaging  Labs ordered and medications adjusted as outlined above  Coordinate care with care team/consultants  Discussed with patient results of tests, management plan as outlined above, and the need for ongoing hospitalization  D/w RN     [facility] high          PHYSICIAN Certification of Need for Inpatient Hospitalization - Initial Certification    Patient will require inpatient services that will reasonably be expected to span two midnight's based on the clinical documentation in H+P.   Based on patients current state of illness, I anticipate that, after discharge, patient will require TBD.        Supplementary Documentation:   DVT Mechanical Prophylaxis:   SCDs, Early ambuation  DVT Pharmacologic Prophylaxis   Medication   None         DVT Pharmacologic prophylaxis: Aspirin 81 mg      Code Status: Full Code  Oseguera: No urinary catheter in place  Oseguera Duration (in days):   Central line:    ERICKSON: 1/2/2025        **Certification      PHYSICIAN Certification of Need for Inpatient Hospitalization - Initial Certification    Patient will require inpatient services that will reasonably be expected to span two midnight's based on the clinical documentation in H+P.   Based on patients current state of illness, I anticipate that, after discharge, patient will require TBD.

## 2025-01-02 LAB
ANION GAP SERPL CALC-SCNC: 5 MMOL/L (ref 0–18)
BASOPHILS # BLD AUTO: 0.05 X10(3) UL (ref 0–0.2)
BASOPHILS NFR BLD AUTO: 0.5 %
BUN BLD-MCNC: 14 MG/DL (ref 9–23)
BUN/CREAT SERPL: 23.3 (ref 10–20)
CALCIUM BLD-MCNC: 7.9 MG/DL (ref 8.7–10.4)
CHLORIDE SERPL-SCNC: 110 MMOL/L (ref 98–112)
CO2 SERPL-SCNC: 27 MMOL/L (ref 21–32)
CREAT BLD-MCNC: 0.6 MG/DL
DEPRECATED RDW RBC AUTO: 47.4 FL (ref 35.1–46.3)
EGFRCR SERPLBLD CKD-EPI 2021: 98 ML/MIN/1.73M2 (ref 60–?)
EOSINOPHIL # BLD AUTO: 0.1 X10(3) UL (ref 0–0.7)
EOSINOPHIL NFR BLD AUTO: 1 %
ERYTHROCYTE [DISTWIDTH] IN BLOOD BY AUTOMATED COUNT: 13.9 % (ref 11–15)
GLUCOSE BLD-MCNC: 109 MG/DL (ref 70–99)
HCT VFR BLD AUTO: 24.2 %
HGB BLD-MCNC: 8 G/DL
IMM GRANULOCYTES # BLD AUTO: 0.06 X10(3) UL (ref 0–1)
IMM GRANULOCYTES NFR BLD: 0.6 %
LYMPHOCYTES # BLD AUTO: 1.22 X10(3) UL (ref 1–4)
LYMPHOCYTES NFR BLD AUTO: 12.7 %
MCH RBC QN AUTO: 30.9 PG (ref 26–34)
MCHC RBC AUTO-ENTMCNC: 33.1 G/DL (ref 31–37)
MCV RBC AUTO: 93.4 FL
MONOCYTES # BLD AUTO: 1.42 X10(3) UL (ref 0.1–1)
MONOCYTES NFR BLD AUTO: 14.8 %
NEUTROPHILS # BLD AUTO: 6.73 X10 (3) UL (ref 1.5–7.7)
NEUTROPHILS # BLD AUTO: 6.73 X10(3) UL (ref 1.5–7.7)
NEUTROPHILS NFR BLD AUTO: 70.4 %
OSMOLALITY SERPL CALC.SUM OF ELEC: 295 MOSM/KG (ref 275–295)
PLATELET # BLD AUTO: 107 10(3)UL (ref 150–450)
POTASSIUM SERPL-SCNC: 3.9 MMOL/L (ref 3.5–5.1)
RBC # BLD AUTO: 2.59 X10(6)UL
SODIUM SERPL-SCNC: 142 MMOL/L (ref 136–145)
TREATCELL: 2
WBC # BLD AUTO: 9.6 X10(3) UL (ref 4–11)

## 2025-01-02 PROCEDURE — 99233 SBSQ HOSP IP/OBS HIGH 50: CPT | Performed by: INTERNAL MEDICINE

## 2025-01-02 NOTE — OCCUPATIONAL THERAPY NOTE
OCCUPATIONAL THERAPY TREATMENT NOTE - INPATIENT        Room Number: 436/436-A  Presenting Problem: L femur fx s/p orif    Problem List  Principal Problem:    Other closed fracture of left femur, unspecified portion of femur, initial encounter ([facility])  Active Problems:    Closed fracture of proximal end of femur ([facility])      OCCUPATIONAL THERAPY ASSESSMENT   Patient demonstrates good  progress this session, goals remain in progress.    Patient is requiring maximum assist as a result of the following impairments: decreased functional strength and decreased endurance.    Patient continues to function below baseline with adls and functional mobility.  Next session anticipate patient to progress lower body dressing, bed mobility, transfers, static standing balance, and functional standing tolerance.  Occupational Therapy will continue to follow patient for duration of hospitalization.    Patient continues to benefit from continued skilled OT services: to facilitate return to prior level of function as patient demonstrates high motivation with excellent tolerance to an intensive therapy program.     PLAN DURING HOSPITALIZATION  OT Device Recommendations: TBD  OT Treatment Plan: Compensatory technique education;Patient/Family training;Patient/Family education;Endurance training;Functional transfer training;ADL training;Balance activities     SUBJECTIVE  \"That was a lot of work\"    OBJECTIVE  Precautions: Limb alert - left;Bed/chair alarm (LLE WBAT)    WEIGHT BEARING RESTRICTION  L Lower Extremity: Weight Bearing as Tolerated    PAIN ASSESSMENT  Rating: -- (pt did not rate pain w/ activity)  Location: -- (L hip)  Management Techniques: Ice    ACTIVITY TOLERANCE   Good  /58 at rest;136/65 w/ activity  HR 87 at rest    O2 SATURATIONS  Activity on room air;91% at rest;94% w/ mobility    ACTIVITIES OF DAILY LIVING ASSESSMENT  AM-PAC ‘6-Clicks’ Inpatient Daily Activity Short Form  How much help from another person does the  patient currently need…  -   Putting on and taking off regular lower body clothing?: A Lot  -   Bathing (including washing, rinsing, drying)?: A Lot  -   Toileting, which includes using toilet, bedpan or urinal? : A Lot  -   Putting on and taking off regular upper body clothing?: A Little  -   Taking care of personal grooming such as brushing teeth?: None  -   Eating meals?: None    AM-PAC Score:  Score: 17  Approx Degree of Impairment: 50.11%  Standardized Score (AM-PAC Scale): 37.26  CMS Modifier (G-Code): CK    FUNCTIONAL ADL ASSESSMENT  Eating: independent  Grooming: setup assist  UB Dressing: min assist  LB Dressing: max assist  Toileting: na    Skilled Therapy Provided: RN contacted prior to start of care. Treatment coordinated w/ PT. Pt agreeable to participation in therapy. Gait belt used during dynamic activity. Pt received in bed, alert and oriented x 4;daughter at bedside.  Pt currently requires max a x 2 to transition supine<>sit at eob. Pt independently maintained unsupported sitting eob. Pt required max a to manage socks in preparation for oob activity. Pt performing sit<>stand bed/chair w/ max a x 2. Pt transferred ~2ft bed<>chair w/ rw and max a x 2;small shuffling steps. After seated rest break pt transferred sit<>stand from bedside chair w/ max a x 2. Pt tolerated static standing w/ rw and min/mod a while weight shifting. Importance of frequent position changes stressed w/ pt encouraged to be up to chair for meals     At end of session pt remaining up in chair w/ all needs in reach;ice to L hip and daughter at bedside. RN aware of pt's status and performance in therapy. Recommend lift for return to bed due to height      EDUCATION PROVIDED  Patient Education : Role of Occupational Therapy; Plan of Care; Functional Transfer Techniques; Fall Prevention; Surgical Precautions (compensatory strategies)  Patient's Response to Education: Verbalized Understanding; Returned Demonstration; Requires Further  Education    The patient's Approx Degree of Impairment: 50.11% has been calculated based on documentation in the [facility] '6 clicks' Inpatient Daily Activity Short Form.  Research supports that patients with this level of impairment may benefit from IRF.  Final disposition will be made by interdisciplinary medical team.    Patient End of Session: Up in chair;Needs met;Call light within reach;RN aware of session/findings;All patient questions and concerns addressed;Family present;Ice applied;Hospital anti-slip socks    OT Goals:   Patient will complete LE dressing with min A, AE PRN  Comment: pt required max a to manage socks prior to mobility    Patient will complete functional TFR with min A  Comment: pt required max a x 2 for transfer w/ rw    Patient will inc stand balance during ADL task to min A   Comment:na     Goals  on: 1/15/25  Frequency: 3-5xs/week    Therapeutic Activity: 23 minutes

## 2025-01-02 NOTE — PHYSICAL THERAPY NOTE
PHYSICAL THERAPY TREATMENT NOTE - INPATIENT     Room Number: 436/436-A       Presenting Problem: fall onto left side w/resultant closed displaced L femur fx s/p ORIF, WBAT  Co-Morbidities : multiple myeloma, HTN, morbid obesity, SLOAN< OA, DJD, iBS, R TKA, lumbar lami    Problem List  Principal Problem:    Other closed fracture of left femur, unspecified portion of femur, initial encounter ([facility])  Active Problems:    Closed fracture of proximal end of femur ([facility])      PHYSICAL THERAPY ASSESSMENT   Patient demonstrates fair progress this session, goals  remain in progress.      Patient is requiring maximum assist x2 as a result of the following impairments: decreased functional strength, pain, impaired static and dynamic balance, decreased muscular endurance, medical status, and limited LLE ROM.     Patient continues to function below baseline with bed mobility, transfers, gait, stair negotiation, standing prolonged periods, and performing household tasks.  Next session anticipate patient to progress bed mobility, transfers, and gait.  Physical Therapy will continue to follow patient for duration of hospitalization.    Patient continues to benefit from continued skilled PT services: to facilitate return to prior level of function as patient demonstrates high motivation with excellent tolerance to an intensive therapy program .    PLAN DURING HOSPITALIZATION  Nursing Mobility Recommendation : Lift Equipment  PT Device Recommendation: Rolling walker  PT Treatment Plan: Bed mobility;Endurance;Patient education;Gait training;Range of motion;Strengthening;Transfer training;Balance training  Frequency (Obs): Daily     SUBJECTIVE  I will try    OBJECTIVE  Precautions: Limb alert - left;Bed/chair alarm (LLE WBAT)    WEIGHT BEARING RESTRICTION  L Lower Extremity: Weight Bearing as Tolerated      PAIN ASSESSMENT   Rating:  (did not quantify per pain scale)  Location: L hip  Management Techniques: Activity promotion;Body  mechanics;Breathing techniques    BALANCE  Static Sitting: Good  Dynamic Sitting: Fair +  Static Standing: Poor +  Dynamic Standing: Poor -    ACTIVITY TOLERANCE  Pulse: 88  Heart Rate Source: Monitor     BP: 120/58 (136/65 at end of session post activity)  BP Location: Right arm  BP Method: Automatic  Patient Position: Semi-[place]     O2 WALK  Oxygen Therapy  SPO2% on Room Air at Rest: 90  SPO2% Ambulation on Room Air: 94    AM-PAC '6-Clicks' INPATIENT SHORT FORM - BASIC MOBILITY  How much difficulty does the patient currently have...  Patient Difficulty: Turning over in bed (including adjusting bedclothes, sheets and blankets)?: A Lot   Patient Difficulty: Sitting down on and standing up from a chair with arms (e.g., wheelchair, bedside commode, etc.): A Lot   Patient Difficulty: Moving from lying on back to sitting on the side of the bed?: A Lot   How much help from another person does the patient currently need...   Help from Another: Moving to and from a bed to a chair (including a wheelchair)?: A Lot   Help from Another: Need to walk in hospital room?: Total   Help from Another: Climbing 3-5 steps with a railing?: Total     AM-PAC Score:  Raw Score: 10   Approx Degree of Impairment: 76.75%   Standardized Score (AM-PAC Scale): 32.29   CMS Modifier (G-Code): CL    FUNCTIONAL ABILITY STATUS  Functional Mobility/Gait Assessment  Gait Assistance: Maximum assistance (x2)  Distance (ft): 2 ft  Assistive Device: Rolling walker  Pattern: Shuffle  Supine to Sit: maximum assist x2  Sit to Stand: maximum assist x2 to RW    Skilled Therapy Provided: Pt rec'd in bed with dtr at bedside, agreeable to therapy, identified by name and , gait belt donned for mobility. Coordinated session with OT to maximize pt outcomes. Pt requiring Max A x2 for all mobility this session, but able to progress static standing to 2 minutes with Mod A>Min A to maintain while performing lateral weight shifts in preparation for initiating gait  training. Pt unable to perform alternating BLE marchind 2/2 pain in LLE. Pt denies dizziness with changes in position this date, vitals monitored and stable throughout. Pt encouraged to perform seated BLE therex for carryover to functional mobility tasks.       The patient's Approx Degree of Impairment: 76.75% has been calculated based on documentation in the [facility] '6 clicks' Inpatient Daily Activity Short Form.  Research supports that patients with this level of impairment may benefit from rehab.  Final disposition will be made by interdisciplinary medical team.    THERAPEUTIC EXERCISES  Lower Extremity Alternating marching  Ankle pumps  LAQ     Position Sitting       Patient End of Session: Up in chair;Needs met;Call light within reach;RN aware of session/findings;Family present    CURRENT GOALS   Goals to be met by: 1/10/25  Patient Goal Patient's self-stated goal is: to get better and be independent   Goal #1 Patient is able to demonstrate supine - sit EOB @ level: minimal assistance   Goal #1   Current Status  in progress   Goal #2 Patient is able to demonstrate transfers Sit to/from Stand at assistance level: minimal assistance with RW      Goal #2  Current Status  in progress   Goal #3 Patient is able to ambulate 50 feet with RW at assistance level: minimal assistance   Goal #3   Current Status  in progress   Goal #4 Patient will negotiate bed to/from chair transfers with a RW with minimal assistance   Goal #4   Current Status  in progress   Goal #5 Patient verbalizes and/or demonstrates all precautions and safety concerns independently   Goal #5   Current Status  ongoing    Goal #6 Patient independently performs home exercise program for ROM/strengthening per the instructions provided in preparation for discharge.   Goal #6  Current Status  ongoing      Therapeutic Activity: 23 minutes

## 2025-01-02 NOTE — CM/SW NOTE
Anticipated therapy need: Intensive Therapy Program    PMR consult requested and enter by Dr. Gupta.   Awaiting Dr. Garg to see pt.    AR ref sent via [facility].    Cyndy Ramires RN, BSN  Nurse   [phone]

## 2025-01-02 NOTE — PLAN OF CARE
Patient is Aox4 on RA. Max 2x asst. CPAP (noc). Voiding via the purewick. Cardiac general diet. VSS. Plan for rehab.       Problem: Patient Centered Care  Goal: Patient preferences are identified and integrated in the patient's plan of care  Description: Interventions:  - What would you like us to know as we care for you? I'm prone to nausea  - Provide timely, complete, and accurate information to patient/family  - Incorporate patient and family knowledge, values, beliefs, and cultural backgrounds into the planning and delivery of care  - Encourage patient/family to participate in care and decision-making at the level they choose  - Honor patient and family perspectives and choices  Outcome: Progressing     Problem: PAIN - ADULT  Goal: Verbalizes/displays adequate comfort level or patient's stated pain goal  Description: INTERVENTIONS:  - Encourage pt to monitor pain and request assistance  - Assess pain using appropriate pain scale  - Administer analgesics based on type and severity of pain and evaluate response  - Implement non-pharmacological measures as appropriate and evaluate response  - Consider cultural and social influences on pain and pain management  - Manage/alleviate anxiety  - Utilize distraction and/or relaxation techniques  - Monitor for opioid side effects  - Notify MD/LIP if interventions unsuccessful or patient reports new pain  - Anticipate increased pain with activity and pre-medicate as appropriate  Outcome: Progressing     Problem: RISK FOR INFECTION - ADULT  Goal: Absence of fever/infection during anticipated neutropenic period  Description: INTERVENTIONS  - Monitor WBC  - Administer growth factors as ordered  - Implement neutropenic guidelines  Outcome: Progressing     Problem: SAFETY ADULT - FALL  Goal: Free from fall injury  Description: INTERVENTIONS:  - Assess pt frequently for physical needs  - Identify cognitive and physical deficits and behaviors that affect risk of falls.  -  [place] fall precautions as indicated by assessment.  - Educate pt/family on patient safety including physical limitations  - Instruct pt to call for assistance with activity based on assessment  - Modify environment to reduce risk of injury  - Provide assistive devices as appropriate  - Consider OT/PT consult to assist with strengthening/mobility  - Encourage toileting schedule  Outcome: Progressing     Problem: DISCHARGE PLANNING  Goal: Discharge to home or other facility with appropriate resources  Description: INTERVENTIONS:  - Identify barriers to discharge w/pt and caregiver  - Include patient/family/discharge partner in discharge planning  - Arrange for needed discharge resources and transportation as appropriate  - Identify discharge learning needs (meds, wound care, etc)  - Arrange for interpreters to assist at discharge as needed  - Consider post-discharge preferences of patient/family/discharge partner  - Complete POLST form as appropriate  - Assess patient's ability to be responsible for managing their own health  - Refer to Case Management Department for coordinating discharge planning if the patient needs post-hospital services based on physician/LIP order or complex needs related to functional status, cognitive ability or social support system  Outcome: Progressing     Problem: SKIN/TISSUE INTEGRITY - ADULT  Goal: Incision(s), wounds(s) or drain site(s) healing without S/S of infection  Description: INTERVENTIONS:  - Assess and document risk factors for pressure ulcer development  - Assess and document skin integrity  - Assess and document dressing/incision, wound bed, drain sites and surrounding tissue  - Implement wound care per orders  - Initiate isolation precautions as appropriate  - Initiate Pressure Ulcer prevention bundle as indicated  Outcome: Progressing

## 2025-01-02 NOTE — PROGRESS NOTES
[facility]  part of [facility]    Progress Note    Jackelin Mackey Patient Status:  Inpatient    1955 MRN [id number]   Location [facility] 4W/SW/SE Attending Jim Gupta MD   Hosp Day # 3 PCP Colby Pandya MD, MD     SUBJECTIVE:  Interval History: Feeling better today, no current complaints.  Pain tolerable with Tylenol, does not like SE with Norco.   Patient denies chest pain, shortness of breath and calf pain.    Post-Op Day: 2    OBJECTIVE:  Blood pressure 144/57, pulse 92, temperature 99.9 °F (37.7 °C), temperature source Oral, resp. rate 18, height 5' (1.524 m), weight 233 lb (105.7 kg), SpO2 95%.     A&O x 3, NAD, No SOB  Lying in bed      Recent Results (from the past 24 hours)   Basic Metabolic Panel (8)    Collection Time: 25  5:25 AM   Result Value Ref Range    Glucose 109 (H) 70 - 99 mg/dL    Sodium 142 136 - 145 mmol/L    Potassium 3.9 3.5 - 5.1 mmol/L    Chloride 110 98 - 112 mmol/L    CO2 27.0 21.0 - 32.0 mmol/L    Anion Gap 5 0 - 18 mmol/L    BUN 14 9 - 23 mg/dL    Creatinine 0.60 0.55 - 1.02 mg/dL    BUN/CREA Ratio 23.3 (H) 10.0 - 20.0    Calcium, Total 7.9 (L) 8.7 - 10.4 mg/dL    Calculated Osmolality 295 275 - 295 mOsm/kg    eGFR-Cr 98 >=60 mL/min/1.73m2   CBC With Differential With Platelet    Collection Time: 25  5:25 AM   Result Value Ref Range    WBC 9.6 4.0 - 11.0 x10(3) uL    RBC 2.59 (L) 3.80 - 5.30 x10(6)uL    HGB 8.0 (L) 12.0 - 16.0 g/dL    HCT 24.2 (L) 35.0 - 48.0 %    MCV 93.4 80.0 - 100.0 fL    MCH 30.9 26.0 - 34.0 pg    MCHC 33.1 31.0 - 37.0 g/dL    RDW-SD 47.4 (H) 35.1 - 46.3 fL    RDW 13.9 11.0 - 15.0 %    .0 (L) 150.0 - 450.0 10(3)uL    Neutrophil Absolute Prelim 6.73 1.50 - 7.70 x10 (3) uL    Neutrophil Absolute 6.73 1.50 - 7.70 x10(3) uL    Lymphocyte Absolute 1.22 1.00 - 4.00 x10(3) uL    Monocyte Absolute 1.42 (H) 0.10 - 1.00 x10(3) uL    Eosinophil Absolute 0.10 0.00 - 0.70 x10(3) uL    Basophil Absolute  0.05 0.00 - 0.20 x10(3) uL    Immature Granulocyte Absolute 0.06 0.00 - 1.00 x10(3) uL    Neutrophil % 70.4 %    Lymphocyte % 12.7 %    Monocyte % 14.8 %    Eosinophil % 1.0 %    Basophil % 0.5 %    Immature Granulocyte % 0.6 %        XR FEMUR MIN 2 VIEWS LEFT (CPT=73552)    Result Date: 1/1/2025  CONCLUSION:   No new fracture or evidence of hardware complication.  Proximal femoral comminuted fracture is similar in appearance to the prior exam status post intramedullary nail placement.        Preliminary report was given by [facility].  There are no clinically significant discrepancies.    elm-remote       Dictated by (CST): Chapin Mcdermott MD on 1/01/2025 at 6:24 AM     Finalized by (CST): Chapin Mcdermott MD on 1/01/2025 at 6:25 AM          XR FEMUR MIN 2 VIEWS LEFT (CPT=73552)    Result Date: 12/31/2024  CONCLUSION: Post ORIF for proximal left femoral diaphysis fracture.  Expected postprocedural changes are present.   Dictated by (CST): Paddy Tapia MD on 12/31/2024 at 4:29 PM     Finalized by (CST): Paddy Tapia MD on 12/31/2024 at 4:31 PM          XR FLUOROSCOPY C-ARM TIME LESS THAN 1 HOUR (CPT=76000)    Result Date: 12/31/2024  CONCLUSION: Fluoroscopic guidance as above.  227.8-seconds of fluoroscopy time were used. 3 fluoroscopic images as well as a 1 page-dose summary image are stored with this exam.  RADIATION DOSE (Dose Area Product):  2.09-mGy*m^2.  Dictated by (CST): Paddy Tapia MD on 12/31/2024 at 3:51 PM     Finalized by (CST): Paddy Tapia MD on 12/31/2024 at 3:52 PM              Ortho Exam:  Dressing clean and dry.  No erythema/drainage.  Pulses 2+.   Sensation is intact.  + DF/PF/EHL.  Calf is soft and nontender. Negative Chi's test.          ASSESSMENT/PLAN:    S/P Left hip long IM nail  1) Continue pain control-Tylenol/IBU  2) Continue DVT prophylaxis with 81mg asa bid x 6 wks  3) Continue PT/OT-WBAT  4)Await PMR consult for rehab placement  5)Dressing change tomorrow-abrahan Jackson  AMANDA Pritchett  1/2/2025  10:08 AM

## 2025-01-02 NOTE — OPERATIVE REPORT
[facility]    PATIENT'S NAME: GERI AGUIRRE   ATTENDING PHYSICIAN: Sun Downs DO   OPERATING PHYSICIAN: Santo Dallas MD   PATIENT ACCOUNT#:   [id number]    LOCATION:  [facility] PACU 1 [facility] 10  MEDICAL RECORD #:   [id number]       YOB: 1955  ADMISSION DATE:       12/30/2024      OPERATION DATE:  12/31/2024    OPERATIVE REPORT      PREOPERATIVE DIAGNOSIS:  Left displaced subtrochanteric femur fracture, morbid obesity.  POSTOPERATIVE DIAGNOSIS:  Left displaced subtrochanteric femur fracture, morbid obesity.  PROCEDURE:  Trochanteric fixation nail, left femur.      ASSISTANT:  Renetta Pritchett PA-C     COMPLICATIONS:  None.    ANESTHESIA:  General.    SPECIMENS REMOVED:  None sent to Pathology.    IMPLANTS USED:  10 x 380 trochanteric fixation nail, left side, with 85 mm spiral blade and 2 distal interlocking screws measuring 38 mm and 40 mm.      BLOOD LOSS:  300 mL.    INDICATIONS:  The patient is a pleasant 69-year-old woman who unfortunately tripped and fell, suffering a subtrochanteric femur fracture.  We discussed the risks and benefits of operative intervention going forward with risks of surgery to include infection, nonunion, malunion, neurovascular injury, anesthetic risk, continued pain, possible need for further surgery, DVT or PE, periprosthetic fracture.  She understood these risks and desired to proceed.    OPERATIVE TECHNIQUE:  After adequate induction of general anesthesia, the left lower extremity prepped and draped in sterile fashion after it was placed on the fracture table.  At this time, a 4 to 5 cm incision was made proximal and slightly posterior towards the tip of the greater trochanter.  Incision was taken down through the dermis with a 10 blade.  Guide pin was placed about the tip of the trochanter and advanced distally.  Proximal cortex was then reamed.  A T-handle device was then placed in order to joystick the proximal fragment.  A crutch was  used about the distal shaft of the femur in order to better align the fragments.  A guide wire was then passed across the fracture down to the distal physeal scar.  Sequential reaming was undertaken to size 11.5, and a 10 x 380 mm long trochanteric fixation nail was placed.  At this time, a lateral incision was made to place the pin across the femoral neck into the femoral head, which was noted to be center-center on the AP and lateral views.  At this time, the outer cortex was reamed, and then the proximal femur was drilled to 85 mm after appropriate measurement.  The spiral blade was then placed without difficulty, and the proximal set screw was tightened.  At this time, image intensification was used to confirm perfect circles distally about the nail and 2 additional interlocking screws were placed, 1 in the static hole and 1 in the dynamic hole.  Once this was completed, all wounds were irrigated thoroughly, closed with 0 Vicryl, 2-0 Vicryl and staples.  Sterile dressings were applied.  The patient tolerated the procedure well.  She was taken to PACU in stable condition.  Please note that prior to the case, a time-out was completed, correct site was identified, and preoperative antibiotics were given.  Please note that assistant was required for the case to help hold the reduction and to assist with reaming and placement of the nail.  It should also be noted that an added level of difficulty was encountered given the patient's morbid obesity, which led to difficulty with retraction and visualization and fracture reduction.     Dictated By Santo Dallas MD  d: 12/31/2024 15:38:56  t: 12/31/2024 16:20:25  [facility] [id number]  [facility]/

## 2025-01-02 NOTE — CONSULTS
[facility] Patient Status:  Inpatient    1955 MRN [id number]   Location [facility] 4W/SW/SE Attending Jim Gupta MD   Hosp Day # 3 PCP Colby Pandya MD, MD       CC: Self-care mobility deficits status post left hip pinning    History of Present Illness:  69-year-old morbidly obese female with history of multiple myeloma in remission who is normally independent presented to the hospital after falling at a cardiology.  She was brought emergency in the hospital where she is found to have proximal left subtrochanteric hip fracture.  Was seen by orthopedic surgery in consultation and has undergone a pinning.  She is weightbearing as tolerated.  She has not been able to move her bowels as yet.  She has been able to urinate.  At baseline patient patient is independent, drives does not use an assistive device.  Postoperatively she has been reluctant to use narcotic pain medications.  P.o. intake is started to improve.  And we were asked to see what the best way to approach her rehabilitation would be.    Prescriptions Prior to Admission[1]  Allergies[2]  Past Medical History:    Back problem    Cancer (HCC)    MULTIPLE MYELOMA    Cataract    High blood pressure    IBS (irritable bowel syndrome)    Osteoarthritis    Sleep apnea    USES CPAP    Visual impairment    WEARS GLASSES     Past Surgical History:   Procedure Laterality Date    Cholecystectomy      Hysterectomy      Other      BLADDER LIFT    Other      LAMINECTOMY,SYNOVIAL CYST REMOVAL    Other  ,2016    STEM CELL TRANSPLANT    Tonsillectomy       Social History     Socioeconomic History    Marital status:      Spouse name: Not on file    Number of children: Not on file    Years of education: Not on file    Highest education level: Not on file   Occupational History    Not on file   Tobacco Use    Smoking status: Never    Smokeless tobacco: Never   Vaping  Use    Vaping status: Never Used   Substance and Sexual Activity    Alcohol use: Yes     Comment: RARE    Drug use: No    Sexual activity: Not on file   Other Topics Concern    Not on file   Social History Narrative    Not on file     Social Drivers of Health     Financial Resource Strain: Not on file   Food Insecurity: No Food Insecurity (12/30/2024)    Food Insecurity     Food Insecurity: Never true   Transportation Needs: No Transportation Needs (12/30/2024)    Transportation Needs     Lack of Transportation: No     Car Seat: Not on file   Physical Activity: Not on file   Stress: Not on file   Social Connections: Unknown (3/10/2021)    Received from [facility]    Social Connections     Conversations with friends/family/neighbors per week: Not on file   Housing Stability: Low Risk  (12/30/2024)    Housing Stability     Housing Instability: No     Housing Instability Emergency: Not on file     Crib or Bassinette: Not on file     Family History   Problem Relation Age of Onset    Heart Disease Father     Other (COPD) Mother     Diabetes Sister        PAIN SCALE: 2/10 at rest.    ACP: Full Code    Review of Systems  Denies any shortness of breath, chest pain, dizziness, nausea, vomiting.  Has been able to urinate.  Has not moved her bowels.  Pain well-controlled at rest.  Increased with activity.  She states her leg does feel heavy postoperatively denies any focal deficits.  In good spirits.  All other systems a 12 point review are negative    Physical Exam  Temp:  [98.4 °F (36.9 °C)-99.9 °F (37.7 °C)] 98.5 °F (36.9 °C)  Pulse:  [81-99] 88  Resp:  [18] 18  BP: (117-144)/(57-70) 136/57  SpO2:  [90 %-95 %] 93 %  93%    I/O last 3 completed shifts:  In: 1265.8 [P.O.:750; I.V.:505.8; IV PIGGYBACK:10]  Out: 2050 [Urine:2050]  I/O this shift:  In: -   Out: 400 [Urine:400]       CVS-S1-S2 RRR no ankle edema bilateral lower extremities  Extremities-warm to touch, peripheral pulses are palpable  Lymph  nodes-no palpable lymph nodes in neck or groin  Skin-left hip incisions has dressings in place.  MSK-bilateral upper extremity strength 5/5.  RLE DF PF 5 HF 4.  LLE DF PF 5 HF 2 limited by pain  Neurological exam-cranial nerves II to XII are to be intact, sensation intact to light touch and vibration.  Alert and oriented x 3 pleasant mood and affect.    Previous Function: Min, drives.  Uses a cane on a as needed basis.  Lives with her  [place] everything is on 1 level however, 7 steps to enter.  No history EtOH abuse or smoking.    CURRENT FUNCTION:  AM-PAC Score:  Raw Score: 10   Approx Degree of Impairment: 76.75%   Standardized Score (AM-PAC Scale): 32.29   CMS Modifier (G-Code): CL     FUNCTIONAL ABILITY STATUS  Functional Mobility/Gait Assessment  Gait Assistance: Maximum assistance (x2)  Distance (ft): 2 ft  Assistive Device: Rolling walker  Pattern: Shuffle  Supine to Sit: maximum assist x2  Sit to Stand: maximum assist x2 to RW       Labs  Lab Results   Component Value Date    WBC 9.6 01/02/2025    HGB 8.0 (L) 01/02/2025    HCT 24.2 (L) 01/02/2025    MCV 93.4 01/02/2025    .0 (L) 01/02/2025     No results found for: \"PTT\"  No results found for: \"PROTIME\"  Lab Results   Component Value Date     01/02/2025    K 3.9 01/02/2025    CO2 27.0 01/02/2025     01/02/2025    BUN 14 01/02/2025       Radiology:  XR FEMUR MIN 2 VIEWS LEFT (CPT=73552)    Result Date: 1/1/2025  PROCEDURE: XR FEMUR MIN 2 VIEWS LEFT (CPT=73552)  COMPARISON: [facility], XR FEMUR MIN 2 VIEWS LEFT (CPT=73552), 12/31/2024, 4:01 PM.  INDICATIONS: Fall post left hip nailing today.  TECHNIQUE: 2 views were obtained.    FINDINGS: Combined with conclusion.         CONCLUSION:   No new fracture or evidence of hardware complication.  Proximal femoral comminuted fracture is similar in appearance to the prior exam status post intramedullary nail placement.        Preliminary report was given by  [facility].  There are no clinically significant discrepancies.    [facility]       Dictated by (CST): Chapin Mcdermott MD on 1/01/2025 at 6:24 AM     Finalized by (CST): Chapin Mcdermott MD on 1/01/2025 at 6:25 AM          XR FEMUR MIN 2 VIEWS LEFT (CPT=73552)    Result Date: 12/31/2024  PROCEDURE: XR FEMUR MIN 2 VIEWS LEFT (CPT=73552)  COMPARISON: [facility], XR HIP W OR WO PELVIS 2 OR 3 VIEWS, LEFT (CPT=73502), 12/30/2024, 11:08 AM.  INDICATIONS: Status post left hip IM nail.  TECHNIQUE: 1 AP view was obtained.   FINDINGS:  BONES: Interval placement of a intramedullary gokul through the long axis of the left femur, bridging across a fracture of the proximal diaphysis of the left femur.  Near anatomic alignment of major fracture fragments bridged by the hardware.  The proximal  end of the gokul is affixed with interlocking pin and distally with 2 interlocking screws.  Osteoarthritis within the visualized left knee. SOFT TISSUES: Skin staples over the lateral aspect of the distal left thigh.  Soft tissue inflammation and emphysema around the femur compatible with recent surgery. EFFUSION: None visible. OTHER: Negative.         CONCLUSION: Post ORIF for proximal left femoral diaphysis fracture.  Expected postprocedural changes are present.   Dictated by (CST): Paddy Tapia MD on 12/31/2024 at 4:29 PM     Finalized by (CST): Paddy Tapia MD on 12/31/2024 at 4:31 PM          XR FLUOROSCOPY C-ARM TIME LESS THAN 1 HOUR (CPT=76000)    Result Date: 12/31/2024  PROCEDURE: XR FLUORO PHYSICIAN TIME< 1 HOUR (CPT=76000)  COMPARISON: None.  INDICATIONS: Left hip nailing  TECHNIQUE: Intraoperative fluoroscopy.   FINDINGS: Fluoroscopic assistance was provided for the ordering physician. A radiologist was not present during this examination.         CONCLUSION: Fluoroscopic guidance as above.  227.8-seconds of fluoroscopy time were used. 3 fluoroscopic images as well as a 1 page-dose summary image are stored with this  exam.  RADIATION DOSE (Dose Area Product):  2.09-mGy*m^2.  Dictated by (CST): Paddy Tapia MD on 12/31/2024 at 3:51 PM     Finalized by (CST): Paddy Tapia MD on 12/31/2024 at 3:52 PM          XR HIP W OR WO PELVIS 2 OR 3 VIEWS, LEFT (CPT=73502)    Result Date: 12/30/2024  PROCEDURE: XR HIP W OR WO PELVIS 2 OR 3 VIEWS, LEFT (CPT=73502)  COMPARISON: None.  INDICATIONS: Left hip fracture. Post fall today.  TECHNIQUE: 4 views were obtained.   FINDINGS:  BONES: There is an acute, foreshortened, and displaced fracture involving the proximal left femoral shaft.  The fracture fragments are foreshortened approximately 9 cm.  The distal fracture fragment is displaced 1.5 shaft lengths posteriorly and medially  when compared to the proximal fracture fragment.  No dislocation.  Minimal bilateral hip, symphysis pubis, and sacroiliac osteoarthrosis.  There are degenerative changes of the visualized lower lumbar spine. SOFT TISSUES: There is soft tissue swelling about the left hip.  There are vascular calcifications. EFFUSION: None visible. OTHER: There is a subcentimeter phlebolith in the right hemipelvis.         CONCLUSION:   Acute, foreshortened, and displaced fracture involving the proximal left femoral shaft.  Recommend orthopedic surgery consultation.  No dislocation.     Dictated by (CST): Luis Chirinos MD on 12/30/2024 at 11:47 AM     Finalized by ([facility]): Luis Chirinos MD on 12/30/2024 at 11:51 AM          [unfilled]    Assessment    Patient Active Problem List   Diagnosis    Other closed fracture of left femur, unspecified portion of femur, initial encounter (HCC)    Closed fracture of proximal end of femur (HCC)       Plan     Continue PT OT  Weightbearing as tolerated  P.o. pain control-using Tylenol ES, ibuprofen.  Can consider tramadol if pain suboptimally controlled when working with therapies.    Bowel program-on Colace, senna.  Encourage p.o. fluids  Patient with good prior level of function, good support  system.  Appropriate candidate for acute inpatient rehabilitation once hemodynamically stable.    Thank you for this consultation and allowing me to participate in this patient's care      CAR SHAIKH MD    1/2/2025    4:24 PM         [1]   Medications Prior to Admission   Medication Sig Dispense Refill Last Dose/Taking    gabapentin 300 MG Oral Cap Take 1 capsule (300 mg total) by mouth every evening. (Patient taking differently: Take 1 capsule (300 mg total) by mouth nightly as needed (neuropathy lower extremities).)   Taking Differently    calcium acetate 667 MG Oral Cap Take 1 capsule (667 mg total) by mouth 2 (two) times daily.   12/30/2024 at  9:00 AM    Omega-3 Fatty Acids (FISH OIL ADULT GUMMIES OR) Take 1 Piece of gum by mouth daily.   12/30/2024 at  9:00 AM    Potassium Chloride ER 20 MEQ Oral Tab CR Take 20 mEq by mouth 2 (two) times daily.   12/30/2024 at  9:00 AM    acyclovir 400 MG Oral Tab Take 1 tablet (400 mg total) by mouth 2 (two) times daily.   12/30/2024 at  9:00 AM    Fluticasone Propionate 50 MCG/ACT Nasal Suspension 1 spray by Each Nare route daily as needed for Rhinitis.   Taking As Needed    cetirizine 10 MG Oral Tab Take 1 tablet (10 mg total) by mouth daily.   12/30/2024 at  9:00 AM    acetaminophen 500 MG Oral Tab Take 1 tablet (500 mg total) by mouth every 6 (six) hours as needed for Pain.   Taking As Needed    Pomalidomide 1 MG Oral Cap Take 1 tablet by mouth daily.   12/30/2024 at  9:00 AM    aspirin 81 MG Oral Tab EC Take 1 tablet (81 mg total) by mouth daily.   12/30/2024 at  9:00 AM    Cyclobenzaprine HCl 5 MG Oral Tab Take 1 tablet (5 mg total) by mouth 3 (three) times daily as needed for Muscle spasms.   12/30/2024 at  9:00 AM   [2]   Allergies  Allergen Reactions    Pravastatin MYALGIA    Dapsone OTHER (SEE COMMENTS)     Lowers hemoglobin    Mastisol Adhesive OTHER (SEE COMMENTS)     Weakens the skin    Atovaquone DIARRHEA    Bactrim [Sulfamethoxazole W/Trimethoprim] RASH

## 2025-01-02 NOTE — PROGRESS NOTES
[facility]  part of [facility] Progress Note     Jackelin Mackey Patient Status:  Inpatient    1955 MRN [id number]   Location [facility] 4W/SW/SE Attending Jim Gupta MD   Hosp Day # 3 PCP Colby Pandya MD, MD     Chief Complaint:   Chief Complaint   Patient presents with    Leg or Foot Injury        Subjective:     Patient seen lying in bed.  No family bedside.  Patient reports pain fairly well-controlled.  Without new complaints today.    Objective:      Vital signs:  Vitals:    25 1941 25 0400 25 0402 25 0755   BP: 125/66  135/62 144/57   BP Location: Left arm  Left arm Right arm   Pulse: 98 81 87 92   Resp: 18  18 18   Temp: 99.1 °F (37.3 °C)  98.4 °F (36.9 °C) 99.9 °F (37.7 °C)   TempSrc: Oral  Temporal Oral   SpO2: 90%  90% 95%   Weight:       Height:           Intake/Output Summary (Last 24 hours) at 2025 0932  Last data filed at 2025 0534  Gross per 24 hour   Intake 250 ml   Output 1450 ml   Net -1200 ml           Physical Exam:    GENERAL:  Awake and alert, in no acute distress.  HEART:  Regular rhythm, regular rate  LUNGS:  Air entry was minimally decreased.  No increased work of breathing or wheezes   ABDOMEN: Soft and non-tender.    PSYCHIATRIC: Normal mood    Diagnostic Data:    Labs:    Recent Labs   Lab 24  0643 25  0441 25  0525   WBC 5.8 12.4* 9.6   HGB 13.2 10.4* 8.0*   MCV 90.2 92.4 93.4   .0 128.0* 107.0*       Recent Labs   Lab 24  0643 25  0441 25  0525   * 118* 109*   BUN 11 16 14   CREATSERUM 0.79 0.80 0.60   CA 9.1 8.5* 7.9*    139 142   K 4.3 4.3 3.9    105 110   CO2 27.0 26.0 27.0           Estimated Creatinine Clearance: 65.4 mL/min (based on SCr of 0.6 mg/dL).    No results for input(s): \"PTP\", \"INR\" in the last 168 hours.         COVID-19  No results found for: \"COVID19\"    Pro-Calcitonin  No results for input(s): \"PCT\" in the  last 168 hours.    Cardiac  No results for input(s): \"TROP\", \"PBNP\" in the last 168 hours.    Inflammatory Markers  No results for input(s): \"CRP\", \"TONNY\", \"LDH\", \"DDIMER\" in the last 168 hours.    Culture:  No results found for this visit on 12/30/24.    XR FEMUR MIN 2 VIEWS LEFT (CPT=73552)    Result Date: 1/1/2025  CONCLUSION:   No new fracture or evidence of hardware complication.  Proximal femoral comminuted fracture is similar in appearance to the prior exam status post intramedullary nail placement.        Preliminary report was given by [facility].  There are no clinically significant discrepancies.    elm-remote       Dictated by (CST): Chapin Mcdermott MD on 1/01/2025 at 6:24 AM     Finalized by (CST): Chapin Mcdermott MD on 1/01/2025 at 6:25 AM          XR FEMUR MIN 2 VIEWS LEFT (CPT=73552)    Result Date: 12/31/2024  CONCLUSION: Post ORIF for proximal left femoral diaphysis fracture.  Expected postprocedural changes are present.   Dictated by (CST): Paddy Tapia MD on 12/31/2024 at 4:29 PM     Finalized by (CST): Paddy Tapia MD on 12/31/2024 at 4:31 PM          XR FLUOROSCOPY C-ARM TIME LESS THAN 1 HOUR (CPT=76000)    Result Date: 12/31/2024  CONCLUSION: Fluoroscopic guidance as above.  227.8-seconds of fluoroscopy time were used. 3 fluoroscopic images as well as a 1 page-dose summary image are stored with this exam.  RADIATION DOSE (Dose Area Product):  2.09-mGy*m^2.  Dictated by (CST): Paddy Tapia MD on 12/31/2024 at 3:51 PM     Finalized by (CST): Paddy Tapia MD on 12/31/2024 at 3:52 PM          XR HIP W OR WO PELVIS 2 OR 3 VIEWS, LEFT (CPT=73502)    Result Date: 12/30/2024  CONCLUSION:   Acute, foreshortened, and displaced fracture involving the proximal left femoral shaft.  Recommend orthopedic surgery consultation.  No dislocation.     Dictated by (CST): Luis Chirinos MD on 12/30/2024 at 11:47 AM     Finalized by (CST): Luis Chirinos MD on 12/30/2024 at 11:51 AM                 Medications:     sennosides  17.2 mg Oral Nightly    docusate sodium  100 mg Oral BID    aspirin  81 mg Oral BID    calcium acetate  1 capsule Oral BID    cetirizine  10 mg Oral Daily       Assessment & Plan:       Acute displaced left proximal femur shaft fracture after a mechanical fall.  - s/p Left hip long IM nail on 12/31  - cont pain control as needed   - Encourage Incentive spirometry  - PT/OT per Ortho, recommending rehab placement  - F/u further Ortho recs  -PMR consulted, appreciate recommendations    HTN  - controlled  - CPM  - Monitor and adjust as needed       Morbid obesity.     Obstructive sleep apnea.    Multiple myeloma  -status post autologous stem cell transplant  -  holding pt's pomalidomide . Pt left message for her onc team at [facility]       Plan of care discussed with patient at bedside . Discussed management/test result(s) with Rn and social work consultant    Quality:  DVT Prophylaxis: Aspirin per surgery  CODE status: Full  Estimated date of discharge: TBD  Discharge is dependent on: clinical stability    53 minutes spent discussing with other providers, examining patient, obtaining history, reviewing medical records, interpreting and communicating test results/imaging, ordering tests/medications, discussing plan of care and documenting information.      Jim Gupta MD          This note was prepared using Dragon Medical voice recognition dictation software. As a result errors may occur. When identified these errors have been corrected. While every attempt is made to correct errors during dictation discrepancies may still exist

## 2025-01-02 NOTE — PLAN OF CARE
Jackelin is A&Ox4 on room air. POD1. Dressing in place. Check void by 0000. IVF infusing. Max assist. X2 S&P to bed. Plan is for rehab. Call light in reach.  Problem: Patient Centered Care  Goal: Patient preferences are identified and integrated in the patient's plan of care  Description: Interventions:  - What would you like us to know as we care for you? I'm prone to nausea  - Provide timely, complete, and accurate information to patient/family  - Incorporate patient and family knowledge, values, beliefs, and cultural backgrounds into the planning and delivery of care  - Encourage patient/family to participate in care and decision-making at the level they choose  - Honor patient and family perspectives and choices  Outcome: Progressing     Problem: PAIN - ADULT  Goal: Verbalizes/displays adequate comfort level or patient's stated pain goal  Description: INTERVENTIONS:  - Encourage pt to monitor pain and request assistance  - Assess pain using appropriate pain scale  - Administer analgesics based on type and severity of pain and evaluate response  - Implement non-pharmacological measures as appropriate and evaluate response  - Consider cultural and social influences on pain and pain management  - Manage/alleviate anxiety  - Utilize distraction and/or relaxation techniques  - Monitor for opioid side effects  - Notify MD/LIP if interventions unsuccessful or patient reports new pain  - Anticipate increased pain with activity and pre-medicate as appropriate  Outcome: Progressing     Problem: RISK FOR INFECTION - ADULT  Goal: Absence of fever/infection during anticipated neutropenic period  Description: INTERVENTIONS  - Monitor WBC  - Administer growth factors as ordered  - Implement neutropenic guidelines  Outcome: Progressing     Problem: SAFETY ADULT - FALL  Goal: Free from fall injury  Description: INTERVENTIONS:  - Assess pt frequently for physical needs  - Identify cognitive and physical deficits and behaviors  that affect risk of falls.  - Adin fall precautions as indicated by assessment.  - Educate pt/family on patient safety including physical limitations  - Instruct pt to call for assistance with activity based on assessment  - Modify environment to reduce risk of injury  - Provide assistive devices as appropriate  - Consider OT/PT consult to assist with strengthening/mobility  - Encourage toileting schedule  Outcome: Progressing     Problem: DISCHARGE PLANNING  Goal: Discharge to home or other facility with appropriate resources  Description: INTERVENTIONS:  - Identify barriers to discharge w/pt and caregiver  - Include patient/family/discharge partner in discharge planning  - Arrange for needed discharge resources and transportation as appropriate  - Identify discharge learning needs (meds, wound care, etc)  - Arrange for interpreters to assist at discharge as needed  - Consider post-discharge preferences of patient/family/discharge partner  - Complete POLST form as appropriate  - Assess patient's ability to be responsible for managing their own health  - Refer to Case Management Department for coordinating discharge planning if the patient needs post-hospital services based on physician/LIP order or complex needs related to functional status, cognitive ability or social support system  Outcome: Progressing

## 2025-01-03 LAB
ANION GAP SERPL CALC-SCNC: 8 MMOL/L (ref 0–18)
ANTIBODY SCREEN: POSITIVE
BASOPHILS # BLD AUTO: 0.05 X10(3) UL (ref 0–0.2)
BASOPHILS NFR BLD AUTO: 0.7 %
BLOOD TYPE BARCODE: 5100
BUN BLD-MCNC: 9 MG/DL (ref 9–23)
BUN/CREAT SERPL: 20 (ref 10–20)
CALCIUM BLD-MCNC: 7.5 MG/DL (ref 8.7–10.4)
CHLORIDE SERPL-SCNC: 111 MMOL/L (ref 98–112)
CO2 SERPL-SCNC: 22 MMOL/L (ref 21–32)
CREAT BLD-MCNC: 0.45 MG/DL
DEPRECATED RDW RBC AUTO: 46.8 FL (ref 35.1–46.3)
DIRECT COOMBS POLY: NEGATIVE
EGFRCR SERPLBLD CKD-EPI 2021: 105 ML/MIN/1.73M2 (ref 60–?)
EOSINOPHIL # BLD AUTO: 0.2 X10(3) UL (ref 0–0.7)
EOSINOPHIL NFR BLD AUTO: 2.7 %
ERYTHROCYTE [DISTWIDTH] IN BLOOD BY AUTOMATED COUNT: 13.9 % (ref 11–15)
GLUCOSE BLD-MCNC: 96 MG/DL (ref 70–99)
HCT VFR BLD AUTO: 19.6 %
HGB BLD-MCNC: 6.6 G/DL
HGB BLD-MCNC: 7.6 G/DL
IMM GRANULOCYTES # BLD AUTO: 0.05 X10(3) UL (ref 0–1)
IMM GRANULOCYTES NFR BLD: 0.7 %
LYMPHOCYTES # BLD AUTO: 1.35 X10(3) UL (ref 1–4)
LYMPHOCYTES NFR BLD AUTO: 18.5 %
MCH RBC QN AUTO: 31.1 PG (ref 26–34)
MCHC RBC AUTO-ENTMCNC: 33.7 G/DL (ref 31–37)
MCV RBC AUTO: 92.5 FL
MONOCYTES # BLD AUTO: 1.16 X10(3) UL (ref 0.1–1)
MONOCYTES NFR BLD AUTO: 15.9 %
NEUTROPHILS # BLD AUTO: 4.5 X10 (3) UL (ref 1.5–7.7)
NEUTROPHILS # BLD AUTO: 4.5 X10(3) UL (ref 1.5–7.7)
NEUTROPHILS NFR BLD AUTO: 61.5 %
OSMOLALITY SERPL CALC.SUM OF ELEC: 291 MOSM/KG (ref 275–295)
PLATELET # BLD AUTO: 91 10(3)UL (ref 150–450)
PLATELETS.RETICULATED NFR BLD AUTO: 2.4 % (ref 0–7)
POTASSIUM SERPL-SCNC: 3.4 MMOL/L (ref 3.5–5.1)
RBC # BLD AUTO: 2.12 X10(6)UL
RGTSCRN: 1
RH BLOOD TYPE: POSITIVE
SODIUM SERPL-SCNC: 141 MMOL/L (ref 136–145)
UNIT VOLUME: 350 ML
WBC # BLD AUTO: 7.3 X10(3) UL (ref 4–11)

## 2025-01-03 PROCEDURE — 99233 SBSQ HOSP IP/OBS HIGH 50: CPT | Performed by: HOSPITALIST

## 2025-01-03 PROCEDURE — 30233N1 TRANSFUSION OF NONAUTOLOGOUS RED BLOOD CELLS INTO PERIPHERAL VEIN, PERCUTANEOUS APPROACH: ICD-10-PCS | Performed by: STUDENT IN AN ORGANIZED HEALTH CARE EDUCATION/TRAINING PROGRAM

## 2025-01-03 RX ORDER — SODIUM CHLORIDE 9 MG/ML
INJECTION, SOLUTION INTRAVENOUS ONCE
Status: COMPLETED | OUTPATIENT
Start: 2025-01-03 | End: 2025-01-03

## 2025-01-03 RX ORDER — POTASSIUM CHLORIDE 1500 MG/1
40 TABLET, EXTENDED RELEASE ORAL ONCE
Status: COMPLETED | OUTPATIENT
Start: 2025-01-03 | End: 2025-01-03

## 2025-01-03 NOTE — PROGRESS NOTES
[facility]  part of [facility]    Progress Note    Jackelin Mackey Patient Status:  Inpatient    1955 MRN [id number]   Location [facility] 4W/SW/SE Attending Annabella Ortiz MD   Hosp Day # 4 PCP Colby Pandya MD, MD     SUBJECTIVE:  Interval History: Patient has no current complaints.  Pain controlled with Tylenol.  Patient denies chest pain, shortness of breath and calf pain.    Post-Op Day: 3    OBJECTIVE:  Blood pressure 132/65, pulse 76, temperature 97.4 °F (36.3 °C), temperature source Oral, resp. rate 18, height 5' (1.524 m), weight 233 lb (105.7 kg), SpO2 99%.     Receiving PRBC's due to Hgb 6.6    A&O x 3, NAD, No SOB  Lying in bed with CPAP on      Recent Results (from the past 24 hours)   Basic Metabolic Panel (8)    Collection Time: 25  5:08 AM   Result Value Ref Range    Glucose 96 70 - 99 mg/dL    Sodium 141 136 - 145 mmol/L    Potassium 3.4 (L) 3.5 - 5.1 mmol/L    Chloride 111 98 - 112 mmol/L    CO2 22.0 21.0 - 32.0 mmol/L    Anion Gap 8 0 - 18 mmol/L    BUN 9 9 - 23 mg/dL    Creatinine 0.45 (L) 0.55 - 1.02 mg/dL    BUN/CREA Ratio 20.0 10.0 - 20.0    Calcium, Total 7.5 (L) 8.7 - 10.4 mg/dL    Calculated Osmolality 291 275 - 295 mOsm/kg    eGFR-Cr 105 >=60 mL/min/1.73m2   CBC With Differential With Platelet    Collection Time: 25  6:30 AM   Result Value Ref Range    WBC 7.3 4.0 - 11.0 x10(3) uL    RBC 2.12 (L) 3.80 - 5.30 x10(6)uL    HGB 6.6 (LL) 12.0 - 16.0 g/dL    HCT 19.6 (L) 35.0 - 48.0 %    MCV 92.5 80.0 - 100.0 fL    MCH 31.1 26.0 - 34.0 pg    MCHC 33.7 31.0 - 37.0 g/dL    RDW-SD 46.8 (H) 35.1 - 46.3 fL    RDW 13.9 11.0 - 15.0 %    PLT 91.0 (L) 150.0 - 450.0 10(3)uL    Immature Platelet Fraction 2.4 0.0 - 7.0 %    Neutrophil Absolute Prelim 4.50 1.50 - 7.70 x10 (3) uL    Neutrophil Absolute 4.50 1.50 - 7.70 x10(3) uL    Lymphocyte Absolute 1.35 1.00 - 4.00 x10(3) uL    Monocyte Absolute 1.16 (H) 0.10 - 1.00 x10(3) uL    Eosinophil  Absolute 0.20 0.00 - 0.70 x10(3) uL    Basophil Absolute 0.05 0.00 - 0.20 x10(3) uL    Immature Granulocyte Absolute 0.05 0.00 - 1.00 x10(3) uL    Neutrophil % 61.5 %    Lymphocyte % 18.5 %    Monocyte % 15.9 %    Eosinophil % 2.7 %    Basophil % 0.7 %    Immature Granulocyte % 0.7 %   Scan slide    Collection Time: 25  6:30 AM   Result Value Ref Range    Slide Review See MD blood smear consultation.    MD BLOOD SMEAR CONSULT    Collection Time: 25  6:30 AM   Result Value Ref Range    MD Blood Smear Consult       Evaluation of the peripheral blood smear demonstrates normochromic normocytic anemia with moderate thrombocytopenia and mild monocytosis. Red blood cells show anisopoikilocytosis with occasional ovalocytes. Monocytes appear reactive.  Platelets are decreased.  No platelet clumping or circulating blasts seen.    Overall, the main differential causes for an anemia of this type may include anemia of chronic disease, iron deficiency (most often due to GI or /GYNE blood loss), some hemoglobinopathies (most commonly thalassemia minor, others) and sideroblastic anemias.      Possible causes of monocytosis may include acute/chronic infections (tuberculosis, Listeria, syphilis, subacute bacterial endocarditis, protozoal and rickettsial organisms), Hodgkin's disease, collagen vascular disorders, immune-mediated gastrointestinal disorders (ulcerative colitis, regional enteritis), non-Hodgkin's lymphomas, sarcoidosis, multiple myeloma, hemolytic anemia, post-splenectomy, immune thrombocytopenic  purpura and myelodysplastic/myeloproliferative disorders.     Differential considerations for thrombocytopenia may include failure of bone marrow production (infections, drugs, fibrosis, myelodysplasia, alcohol suppression, congenital [TAR syndrome, Wiskott-Connie syndrome, Fanconi anemia, May-Hegglin anomaly, Damon-Soulier syndrome, Alport syndrome,  rubella/CMV, maternal thiazides], etc.),  autoantibody immune thrombocytopenia (SLE, lymphomas, drugs, infections, ITP), alloantibody immune thrombocytopenia (post transfusion purpura), non-immune conditions (DIC, TTP, mechanical), splenic sequestration and hemodilution.      Clinical correlation is recommended.     Reviewed by RANCHO Stafford M.D.     Prepare RBC Once    Collection Time: 01/03/25  7:25 AM   Result Value Ref Range    Blood Product [id number]     Unit Number [id number]     UNIT ABO O     UNIT RH POS     Product Status Released from Crossmatch     Expiration Date [id number]     Blood Type Barcode 5100     Unit Volume 350 ml   ABORH (Blood Type)    Collection Time: 01/03/25 11:16 AM   Result Value Ref Range    ABO BLOOD TYPE O     RH BLOOD TYPE Positive    Antibody Screen    Collection Time: 01/03/25 11:16 AM   Result Value Ref Range    Antibody Screen Positive    Direct Antiglobulin Test    Collection Time: 01/03/25 11:16 AM   Result Value Ref Range    Nazario (POLY) Negative    RGTSCRN    Collection Time: 01/03/25 11:16 AM   Result Value Ref Range    AG SCRN WITH REAGENT 1    Prepare RBC Once    Collection Time: 01/03/25  3:17 PM   Result Value Ref Range    Blood Product [id number]     Unit Number [id number]     UNIT ABO O     UNIT RH NEG     Product Status Issued     Expiration Date 781291500845     Blood Type Barcode 9500     Unit Volume 250 ml        No results found.       Ortho Exam:  Dressing clean and dry.  Dressing changed to aquacel yesterday. Skin tears from tape per RN, covered with aquacel. No active drainage, no erythema.  Pulses 2+.   Sensation is intact.  + DF/PF/EHL.  Calf is soft and nontender. Negative Chi's test.   Compartments soft.      ASSESSMENT/PLAN:    S/P Left hip long IM nail    1) Continue pain control-tylenol  2) Continue DVT prophylaxis with 81mg asa bid x 6 wks  3) Continue PT/OT-as able  4)d/c planning to rehab when cleared by other services  5)Post-op appt already scheduled  6)appreciate medicine following  and hematology input    Renetta Pritchett PA-C  1/3/2025  3:25 PM

## 2025-01-03 NOTE — CM/SW NOTE
01/03/25 1100   Discharge Needs   Anticipated D/C needs Acute rehab   Choice of Post-Acute Provider   Informed patient of right to choose their preferred provider Yes   List of appropriate post-acute services provided to patient/family with quality data Yes   Patient/family choice [facility]/ [facility]     DIAZ met with Jackelin at bedside to f/u on AR choice.  Pt requesting to rehab at [facility] (1st choice) or [facility] (2nd).  Aidin choice list also provided incase no beds at either location.    DIAZ called [facility] and spoke w/ Kimmy ([phone]). She is aware of pts choices and partners with liaison from [facility]. Together they will review pts chart and will have PMR review.  Awaiting call back.    1400: DIAZ f/u up with Irina at [facility]/ [facility] ([phone]), pts chart is still under review. Awaiting physician review on their end. Current therapy notes indicate pt is using a lift for transfers.  Irina requesting updated therapy notes over the weekend w/ followup call Sat/ Monday.    Plan: [facility] vs [facility] when med stable.    / to remain available for support and/or discharge planning.   Cyndy Ramires RN, BSN  Nurse   [phone]

## 2025-01-03 NOTE — PLAN OF CARE
Pt a/o x4. On 2L NC, weaning as able. Did not wear her CPAP overnight and desatted. Hemoglobin 6.6 this morning, to receive 1 unit PRBCs. Potassium replaced per protocol. Oseguera inserted for retention. On remote tele. Take motrin and tylenol for pain. Plan to discharge to acute rehab.      Problem: PAIN - ADULT  Goal: Verbalizes/displays adequate comfort level or patient's stated pain goal  Description: INTERVENTIONS:  - Encourage pt to monitor pain and request assistance  - Assess pain using appropriate pain scale  - Administer analgesics based on type and severity of pain and evaluate response  - Implement non-pharmacological measures as appropriate and evaluate response  - Consider cultural and social influences on pain and pain management  - Manage/alleviate anxiety  - Utilize distraction and/or relaxation techniques  - Monitor for opioid side effects  - Notify MD/LIP if interventions unsuccessful or patient reports new pain  - Anticipate increased pain with activity and pre-medicate as appropriate  Outcome: Progressing     Problem: RISK FOR INFECTION - ADULT  Goal: Absence of fever/infection during anticipated neutropenic period  Description: INTERVENTIONS  - Monitor WBC  - Administer growth factors as ordered  - Implement neutropenic guidelines  Outcome: Progressing     Problem: SAFETY ADULT - FALL  Goal: Free from fall injury  Description: INTERVENTIONS:  - Assess pt frequently for physical needs  - Identify cognitive and physical deficits and behaviors that affect risk of falls.  - [place] fall precautions as indicated by assessment.  - Educate pt/family on patient safety including physical limitations  - Instruct pt to call for assistance with activity based on assessment  - Modify environment to reduce risk of injury  - Provide assistive devices as appropriate  - Consider OT/PT consult to assist with strengthening/mobility  - Encourage toileting schedule  Outcome: Progressing     Problem: DISCHARGE  PLANNING  Goal: Discharge to home or other facility with appropriate resources  Description: INTERVENTIONS:  - Identify barriers to discharge w/pt and caregiver  - Include patient/family/discharge partner in discharge planning  - Arrange for needed discharge resources and transportation as appropriate  - Identify discharge learning needs (meds, wound care, etc)  - Arrange for interpreters to assist at discharge as needed  - Consider post-discharge preferences of patient/family/discharge partner  - Complete POLST form as appropriate  - Assess patient's ability to be responsible for managing their own health  - Refer to Case Management Department for coordinating discharge planning if the patient needs post-hospital services based on physician/LIP order or complex needs related to functional status, cognitive ability or social support system  Outcome: Progressing     Problem: SKIN/TISSUE INTEGRITY - ADULT  Goal: Incision(s), wounds(s) or drain site(s) healing without S/S of infection  Description: INTERVENTIONS:  - Assess and document risk factors for pressure ulcer development  - Assess and document skin integrity  - Assess and document dressing/incision, wound bed, drain sites and surrounding tissue  - Implement wound care per orders  - Initiate isolation precautions as appropriate  - Initiate Pressure Ulcer prevention bundle as indicated  Outcome: Progressing     Problem: MUSCULOSKELETAL - ADULT  Goal: Return mobility to safest level of function  Description: INTERVENTIONS:  - Assess patient stability and activity tolerance for standing, transferring and ambulating w/ or w/o assistive devices  - Assist with transfers and ambulation using safe patient handling equipment as needed  - Ensure adequate protection for wounds/incisions during mobilization  - Obtain PT/OT consults as needed  - Advance activity as appropriate  - Communicate ordered activity level and limitations with patient/family  Outcome:  Progressing  Goal: Maintain proper alignment of affected body part  Description: INTERVENTIONS:  - Support and protect limb and body alignment per provider's orders  - Instruct and reinforce with patient and family use of appropriate assistive device and precautions (e.g. spinal or hip dislocation precautions)  Outcome: Progressing

## 2025-01-03 NOTE — PROGRESS NOTES
[facility]  part of [facility]    Progress Note    Jackelin Mackey Patient Status:  Inpatient    1955 MRN [id number]   Location [facility] 4W/SW/SE Attending Annabella Ortiz MD   Hosp Day # 4 PCP Colby Pandya MD, MD     Chief Complaint:     Closed fracture of left femur    Subjective:   Subjective:    Patient seen and examined  Afebrile normotensive  Found to have hemoglobin of 6.6 this morning  Endorsing pain but it has been controlled    Objective:   Blood pressure 114/62, pulse 80, temperature 98 °F (36.7 °C), temperature source Oral, resp. rate 16, height 5' (1.524 m), weight 233 lb (105.7 kg), SpO2 97%.  Physical Exam    General: Patient is alert and oriented   HEENT: EOMI PERRLA, atraumatic normocephalic  Cardiac: S1-S2 appreciated  Lungs: Good air entry bilaterally clear to auscultation  Abdomen: Soft nontender nondistended positive bowel sounds  Ext: Peripheral pulses are positive  Neuro: No focal deficits noted  Psych: Normal mood        Results:   Lab Results   Component Value Date    WBC 7.3 2025    HGB 6.6 (LL) 2025    HCT 19.6 (L) 2025    PLT 91.0 (L) 2025    CREATSERUM 0.45 (L) 2025    BUN 9 2025     2025    K 3.4 (L) 2025     2025    CO2 22.0 2025    GLU 96 2025    CA 7.5 (L) 2025    ALB 4.0 10/27/2020    ALKPHO 62 10/27/2020    BILT 0.54 10/27/2020    TP 6.4 10/27/2020    AST 21 10/27/2020    ALT 21 10/27/2020       No results found.        Assessment & Plan:       Acute displaced left proximal femur shaft fracture after a mechanical fall.  - s/p Left hip long IM nail on   - cont pain control as needed   - Encourage Incentive spirometry  - PT/OT per Ortho, recommending rehab placement  - F/u further Ortho recs  -PMR consulted, appreciate recommendations     HTN  - controlled  - CPM  - Monitor and adjust as needed       Morbid obesity.     Obstructive sleep  apnea.     Multiple myeloma  -status post autologous stem cell transplant  -  holding pt's pomalidomide . Pt left message for her onc team at [facility]         Plan of care discussed with patient at bedside . Discussed management/test result(s) with Rn and social work consultant     Quality:  DVT Prophylaxis: Aspirin per surgery  CODE status: Full  Estimated date of discharge: TBD  Discharge is dependent on: clinical stability      Global assessment and plan  -Hemoglobin 6.6 today plan to transfuse PRBC today  -Leukocytosis resolved  -Hypokalemia continue electrolyte replacement protocol  -Reviewed previous consultant notes  -Reviewed CBC, BMP, Mag, and Phos  -Reviewed tests ordered  -Repeat labs in am  -MDM: High, severe exacerbation of chronic illness posing a threat to life. IV medications requiring close inpatient monitoring.     Annabella Ortiz MD  1/3/2025

## 2025-01-03 NOTE — PHYSICAL THERAPY NOTE
Attempted follow up treatment this AM, chart reviewed. Per RN pt to receive blood transfusion this morning for sub-therapeutic Hgb of 6.6. Will re-attempt at later time as schedule allows and pt is appropriate.

## 2025-01-03 NOTE — PROGRESS NOTES
Per Dr. Ortiz's request I was asked to acknowledge incompatibility crossmatch due to Daratumumab treatment form.    I discussed with hematology service Dr Ellis who stated that this is a typical cross reaction with her chemotherapy treatment and that it was safe to give 0 negative blood and monitor repeat hemoglobin 3 to 6 hours posttransfusion.      Dr Lisa also informed of above and will follow up on repeat Hb post transfusion.    Kim Gary MD

## 2025-01-03 NOTE — PLAN OF CARE
Amara is a 2 person stand and pivot to transfer. Straight cathed for urinary retention tonight. Next time will be a choi insertion. She had an incontinent BM, moderate amount formed stool. Remote tele monitoring maintained. SCD's and ASA for DVT prevention. Using CPAP at night. Discharge plan is for [facility] but will need a PMR consult first.  Problem: Patient Centered Care  Goal: Patient preferences are identified and integrated in the patient's plan of care  Description: Interventions:  - What would you like us to know as we care for you? I'm prone to nausea  - Provide timely, complete, and accurate information to patient/family  - Incorporate patient and family knowledge, values, beliefs, and cultural backgrounds into the planning and delivery of care  - Encourage patient/family to participate in care and decision-making at the level they choose  - Honor patient and family perspectives and choices  Outcome: Progressing     Problem: Patient/Family Goals  Goal: Patient/Family Long Term Goal  Description: Patient's Long Term Goal: Discharge home    Interventions:  - Manage pain, increase activity out of bed  - See additional Care Plan goals for specific interventions  Outcome: Progressing  Goal: Patient/Family Short Term Goal  Description: Patient's Short Term Goal: Not be nauseated    Interventions:   - PRN antiemetics as ordered. Dr. Downs aware  - See additional Care Plan goals for specific interventions  Outcome: Progressing     Problem: PAIN - ADULT  Goal: Verbalizes/displays adequate comfort level or patient's stated pain goal  Description: INTERVENTIONS:  - Encourage pt to monitor pain and request assistance  - Assess pain using appropriate pain scale  - Administer analgesics based on type and severity of pain and evaluate response  - Implement non-pharmacological measures as appropriate and evaluate response  - Consider cultural and social influences on pain and pain management  - Manage/alleviate anxiety  -  Utilize distraction and/or relaxation techniques  - Monitor for opioid side effects  - Notify MD/LIP if interventions unsuccessful or patient reports new pain  - Anticipate increased pain with activity and pre-medicate as appropriate  Outcome: Progressing     Problem: RISK FOR INFECTION - ADULT  Goal: Absence of fever/infection during anticipated neutropenic period  Description: INTERVENTIONS  - Monitor WBC  - Administer growth factors as ordered  - Implement neutropenic guidelines  Outcome: Progressing     Problem: SAFETY ADULT - FALL  Goal: Free from fall injury  Description: INTERVENTIONS:  - Assess pt frequently for physical needs  - Identify cognitive and physical deficits and behaviors that affect risk of falls.  - [place] fall precautions as indicated by assessment.  - Educate pt/family on patient safety including physical limitations  - Instruct pt to call for assistance with activity based on assessment  - Modify environment to reduce risk of injury  - Provide assistive devices as appropriate  - Consider OT/PT consult to assist with strengthening/mobility  - Encourage toileting schedule  Outcome: Progressing     Problem: DISCHARGE PLANNING  Goal: Discharge to home or other facility with appropriate resources  Description: INTERVENTIONS:  - Identify barriers to discharge w/pt and caregiver  - Include patient/family/discharge partner in discharge planning  - Arrange for needed discharge resources and transportation as appropriate  - Identify discharge learning needs (meds, wound care, etc)  - Arrange for interpreters to assist at discharge as needed  - Consider post-discharge preferences of patient/family/discharge partner  - Complete POLST form as appropriate  - Assess patient's ability to be responsible for managing their own health  - Refer to Case Management Department for coordinating discharge planning if the patient needs post-hospital services based on physician/LIP order or complex needs related to  functional status, cognitive ability or social support system  Outcome: Progressing     Problem: SKIN/TISSUE INTEGRITY - ADULT  Goal: Incision(s), wounds(s) or drain site(s) healing without S/S of infection  Description: INTERVENTIONS:  - Assess and document risk factors for pressure ulcer development  - Assess and document skin integrity  - Assess and document dressing/incision, wound bed, drain sites and surrounding tissue  - Implement wound care per orders  - Initiate isolation precautions as appropriate  - Initiate Pressure Ulcer prevention bundle as indicated  Outcome: Progressing     Problem: MUSCULOSKELETAL - ADULT  Goal: Return mobility to safest level of function  Description: INTERVENTIONS:  - Assess patient stability and activity tolerance for standing, transferring and ambulating w/ or w/o assistive devices  - Assist with transfers and ambulation using safe patient handling equipment as needed  - Ensure adequate protection for wounds/incisions during mobilization  - Obtain PT/OT consults as needed  - Advance activity as appropriate  - Communicate ordered activity level and limitations with patient/family  Outcome: Progressing  Goal: Maintain proper alignment of affected body part  Description: INTERVENTIONS:  - Support and protect limb and body alignment per provider's orders  - Instruct and reinforce with patient and family use of appropriate assistive device and precautions (e.g. spinal or hip dislocation precautions)  Outcome: Progressing

## 2025-01-04 LAB
ANION GAP SERPL CALC-SCNC: 5 MMOL/L (ref 0–18)
BASOPHILS # BLD AUTO: 0.05 X10(3) UL (ref 0–0.2)
BASOPHILS NFR BLD AUTO: 0.9 %
BLOOD TYPE BARCODE: 9500
BUN BLD-MCNC: 9 MG/DL (ref 9–23)
BUN/CREAT SERPL: 20.5 (ref 10–20)
CALCIUM BLD-MCNC: 7.8 MG/DL (ref 8.7–10.4)
CHLORIDE SERPL-SCNC: 110 MMOL/L (ref 98–112)
CO2 SERPL-SCNC: 27 MMOL/L (ref 21–32)
CREAT BLD-MCNC: 0.44 MG/DL
DEPRECATED RDW RBC AUTO: 46.3 FL (ref 35.1–46.3)
EGFRCR SERPLBLD CKD-EPI 2021: 106 ML/MIN/1.73M2 (ref 60–?)
EOSINOPHIL # BLD AUTO: 0.25 X10(3) UL (ref 0–0.7)
EOSINOPHIL NFR BLD AUTO: 4.5 %
ERYTHROCYTE [DISTWIDTH] IN BLOOD BY AUTOMATED COUNT: 13.8 % (ref 11–15)
GLUCOSE BLD-MCNC: 87 MG/DL (ref 70–99)
HCT VFR BLD AUTO: 22.9 %
HGB BLD-MCNC: 7.9 G/DL
IMM GRANULOCYTES # BLD AUTO: 0.02 X10(3) UL (ref 0–1)
IMM GRANULOCYTES NFR BLD: 0.4 %
LYMPHOCYTES # BLD AUTO: 1.6 X10(3) UL (ref 1–4)
LYMPHOCYTES NFR BLD AUTO: 29.1 %
MAGNESIUM SERPL-MCNC: 1.9 MG/DL (ref 1.6–2.6)
MCH RBC QN AUTO: 31.7 PG (ref 26–34)
MCHC RBC AUTO-ENTMCNC: 34.5 G/DL (ref 31–37)
MCV RBC AUTO: 92 FL
MONOCYTES # BLD AUTO: 0.91 X10(3) UL (ref 0.1–1)
MONOCYTES NFR BLD AUTO: 16.5 %
NEUTROPHILS # BLD AUTO: 2.67 X10 (3) UL (ref 1.5–7.7)
NEUTROPHILS # BLD AUTO: 2.67 X10(3) UL (ref 1.5–7.7)
NEUTROPHILS NFR BLD AUTO: 48.6 %
OSMOLALITY SERPL CALC.SUM OF ELEC: 292 MOSM/KG (ref 275–295)
PHOSPHATE SERPL-MCNC: 2.4 MG/DL (ref 2.4–5.1)
PLATELET # BLD AUTO: 124 10(3)UL (ref 150–450)
PLATELETS.RETICULATED NFR BLD AUTO: 1.9 % (ref 0–7)
POTASSIUM SERPL-SCNC: 3.7 MMOL/L (ref 3.5–5.1)
POTASSIUM SERPL-SCNC: 3.7 MMOL/L (ref 3.5–5.1)
RBC # BLD AUTO: 2.49 X10(6)UL
SODIUM SERPL-SCNC: 142 MMOL/L (ref 136–145)
UNIT VOLUME: 250 ML
WBC # BLD AUTO: 5.5 X10(3) UL (ref 4–11)

## 2025-01-04 PROCEDURE — 99233 SBSQ HOSP IP/OBS HIGH 50: CPT | Performed by: STUDENT IN AN ORGANIZED HEALTH CARE EDUCATION/TRAINING PROGRAM

## 2025-01-04 NOTE — PLAN OF CARE
Post-op day #4. Dressing in place to left hip. No acute changes noted throughout shift. Receiving IV fluids per MD order. Tolerating diet. Voiding via choi. CPAP on overnight. SCDs for DVT prophylaxis. Pain medication provided as needed. Fall precautions maintained- bed alarm on, bed locked in lowest position, call light and personal belongings within reach, non-skid socks in place to bilateral feet. Frequent rounding by nursing staff. Plan for acute rehab when ready for discharge.     Problem: Patient Centered Care  Goal: Patient preferences are identified and integrated in the patient's plan of care  Description: Interventions:  - What would you like us to know as we care for you? I'm prone to nausea  - Provide timely, complete, and accurate information to patient/family  - Incorporate patient and family knowledge, values, beliefs, and cultural backgrounds into the planning and delivery of care  - Encourage patient/family to participate in care and decision-making at the level they choose  - Honor patient and family perspectives and choices  Outcome: Progressing     Problem: Patient/Family Goals  Goal: Patient/Family Long Term Goal  Description: Patient's Long Term Goal: Discharge home    Interventions:  - Manage pain, increase activity out of bed  - See additional Care Plan goals for specific interventions  Outcome: Not Progressing  Goal: Patient/Family Short Term Goal  Description: Patient's Short Term Goal: Not be nauseated    Interventions:   - PRN antiemetics as ordered. Dr. Downs aware  - See additional Care Plan goals for specific interventions  Outcome: Progressing     Problem: PAIN - ADULT  Goal: Verbalizes/displays adequate comfort level or patient's stated pain goal  Description: INTERVENTIONS:  - Encourage pt to monitor pain and request assistance  - Assess pain using appropriate pain scale  - Administer analgesics based on type and severity of pain and evaluate response  - Implement  non-pharmacological measures as appropriate and evaluate response  - Consider cultural and social influences on pain and pain management  - Manage/alleviate anxiety  - Utilize distraction and/or relaxation techniques  - Monitor for opioid side effects  - Notify MD/LIP if interventions unsuccessful or patient reports new pain  - Anticipate increased pain with activity and pre-medicate as appropriate  Outcome: Progressing     Problem: RISK FOR INFECTION - ADULT  Goal: Absence of fever/infection during anticipated neutropenic period  Description: INTERVENTIONS  - Monitor WBC  - Administer growth factors as ordered  - Implement neutropenic guidelines  Outcome: Progressing     Problem: SAFETY ADULT - FALL  Goal: Free from fall injury  Description: INTERVENTIONS:  - Assess pt frequently for physical needs  - Identify cognitive and physical deficits and behaviors that affect risk of falls.  - [place] fall precautions as indicated by assessment.  - Educate pt/family on patient safety including physical limitations  - Instruct pt to call for assistance with activity based on assessment  - Modify environment to reduce risk of injury  - Provide assistive devices as appropriate  - Consider OT/PT consult to assist with strengthening/mobility  - Encourage toileting schedule  Outcome: Progressing     Problem: DISCHARGE PLANNING  Goal: Discharge to home or other facility with appropriate resources  Description: INTERVENTIONS:  - Identify barriers to discharge w/pt and caregiver  - Include patient/family/discharge partner in discharge planning  - Arrange for needed discharge resources and transportation as appropriate  - Identify discharge learning needs (meds, wound care, etc)  - Arrange for interpreters to assist at discharge as needed  - Consider post-discharge preferences of patient/family/discharge partner  - Complete POLST form as appropriate  - Assess patient's ability to be responsible for managing their own health  -  Refer to Case Management Department for coordinating discharge planning if the patient needs post-hospital services based on physician/LIP order or complex needs related to functional status, cognitive ability or social support system  Outcome: Not Progressing     Problem: SKIN/TISSUE INTEGRITY - ADULT  Goal: Incision(s), wounds(s) or drain site(s) healing without S/S of infection  Description: INTERVENTIONS:  - Assess and document risk factors for pressure ulcer development  - Assess and document skin integrity  - Assess and document dressing/incision, wound bed, drain sites and surrounding tissue  - Implement wound care per orders  - Initiate isolation precautions as appropriate  - Initiate Pressure Ulcer prevention bundle as indicated  Outcome: Progressing     Problem: MUSCULOSKELETAL - ADULT  Goal: Return mobility to safest level of function  Description: INTERVENTIONS:  - Assess patient stability and activity tolerance for standing, transferring and ambulating w/ or w/o assistive devices  - Assist with transfers and ambulation using safe patient handling equipment as needed  - Ensure adequate protection for wounds/incisions during mobilization  - Obtain PT/OT consults as needed  - Advance activity as appropriate  - Communicate ordered activity level and limitations with patient/family  Outcome: Not Progressing  Goal: Maintain proper alignment of affected body part  Description: INTERVENTIONS:  - Support and protect limb and body alignment per provider's orders  - Instruct and reinforce with patient and family use of appropriate assistive device and precautions (e.g. spinal or hip dislocation precautions)  Outcome: Progressing

## 2025-01-04 NOTE — PROGRESS NOTES
[facility]  Progress Note    Jackelin Mackey Patient Status:  Inpatient    1955 MRN [id number]   Location [facility] 4W/SW/SE Attending Dedra Chavira MD   Hosp Day # 5 PCP Colby Pandya MD, MD     SUBJECTIVE:  INTERVAL HISTORY: POD 4 s/p left hip IM nail  Patient is doing well. Pain controlled with PO pain meds, only using motrin and tylenol at this time. Patient denies chest pain, shortness of breath, fevers, and calf pain. Hg up to 7.9 today.     OBJECTIVE:  Patient Vitals for the past 24 hrs:   BP Temp Temp src Pulse Resp SpO2   25 0740 140/72 97.2 °F (36.2 °C) Oral 68 16 97 %   25 0556 138/62 98.4 °F (36.9 °C) Oral -- 16 99 %   25 0300 -- -- -- 61 -- --   25 1927 142/70 98.8 °F (37.1 °C) Oral -- 16 100 %   25 1900 -- -- -- 80 -- --   25 1727 150/70 98.6 °F (37 °C) Oral 80 16 98 %   25 1620 127/66 98.1 °F (36.7 °C) Axillary 76 18 99 %   25 1536 119/56 97 °F (36.1 °C) Axillary 78 16 97 %   25 1531 -- -- -- -- -- (!) 88 %   25 1514 132/65 97.4 °F (36.3 °C) Oral 76 18 99 %       ORTHO EXAM:   Patient sitting comfortably in recliner. SCD on bilaterally.  Left lower extremity:  Neurovascularly intact. Thigh and calf soft, non-tender. Negative Chi's sign. Dressings clean, dry, and intact.  No erythema present.       LABORATORY:  Recent Labs   Lab 25  0609   RBC 2.49*   HGB 7.9*   HCT 22.9*   MCV 92.0   MCH 31.7   MCHC 34.5   RDW 13.8   NEPRELIM 2.67   WBC 5.5   .0*      No results for input(s): \"PTP\", \"INR\" in the last 168 hours.      ASSESSMENT/PLAN:  POD 4 s/p left hip IM nail  Continue pain management, will continue with tylenol and ibuprofen. Not interested in narcotics for discharge  Continue DVT prophylaxis with asa 81mg BID  Continue PT/OT  Discharge planning: to rehab when medically cleared. Continue to monitor hemoglobin  Dressing change q3 days or sooner prn >50% saturation  Continue medical  management  Follow up in office with Santo Dallas MD in 2 weeks      Tara Pagan PA-C  1/4/2025  12:22 PM

## 2025-01-04 NOTE — PHYSICAL THERAPY NOTE
PHYSICAL THERAPY TREATMENT NOTE - INPATIENT     Room Number: 436/436-A       Presenting Problem: fall onto left side w/resultant closed displaced L femur fx s/p ORIF, WBAT  Co-Morbidities : multiple myeloma, HTN, morbid obesity, SLOAN< OA, DJD, iBS, R TKA, lumbar lami    Problem List  Principal Problem:    Other closed fracture of left femur, unspecified portion of femur, initial encounter ([facility])  Active Problems:    Closed fracture of proximal end of femur ([facility])      PHYSICAL THERAPY ASSESSMENT   Patient demonstrates good  progress this session, goals  remain in progress.      Patient is requiring moderate assist and maximum assist as a result of the following impairments: decreased functional strength, pain, impaired standing balance, impaired coordination, decreased muscular endurance, and limited left leg ROM.     Patient continues to function below baseline with transfers, gait, stair negotiation, and standing prolonged periods.  Next session anticipate patient to progress bed mobility, transfers, and gait.  Physical Therapy will continue to follow patient for duration of hospitalization.    Patient continues to benefit from continued skilled PT services: to facilitate return to prior level of function as patient demonstrates high motivation with excellent tolerance to an intensive therapy program .    PLAN DURING HOSPITALIZATION  Nursing Mobility Recommendation : 1 Assist  PT Device Recommendation: Rolling walker  PT Treatment Plan: Gait training;Endurance;Balance training;Transfer training;Patient education;Coordination  Frequency (Obs): Daily     SUBJECTIVE  I feel better.    OBJECTIVE  Precautions: Limb alert - left;Bed/chair alarm    WEIGHT BEARING RESTRICTION  L Lower Extremity: Weight Bearing as Tolerated      PAIN ASSESSMENT   Rating: 3  Location: left hip  Management Techniques: Activity promotion;Body mechanics;Repositioning    BALANCE  Static Sitting: Good  Dynamic Sitting: Good  Static Standing: Fair  -  Dynamic Standing: Poor +    ACTIVITY TOLERANCE  Pulse: 68  Heart Rate Source: Monitor  Resp: 16  BP: 140/72  BP Location: Right arm  BP Method: Automatic  Patient Position: Lying     O2 WALK  Oxygen Therapy  SPO2% on Room Air at Rest: 98  SPO2% on Oxygen at Rest: 2  SPO2% Ambulation on Room Air: 94  SPO2% Ambulation on Oxygen: 2    AM-PAC '6-Clicks' INPATIENT SHORT FORM - BASIC MOBILITY  How much difficulty does the patient currently have...  Patient Difficulty: Turning over in bed (including adjusting bedclothes, sheets and blankets)?: A Lot   Patient Difficulty: Sitting down on and standing up from a chair with arms (e.g., wheelchair, bedside commode, etc.): A Lot   Patient Difficulty: Moving from lying on back to sitting on the side of the bed?: A Lot   How much help from another person does the patient currently need...   Help from Another: Moving to and from a bed to a chair (including a wheelchair)?: A Lot   Help from Another: Need to walk in hospital room?: A Lot   Help from Another: Climbing 3-5 steps with a railing?: Total     AM-PAC Score:  Raw Score: 11   Approx Degree of Impairment: 72.57%   Standardized Score (AM-PAC Scale): 33.86   CMS Modifier (G-Code): CL    FUNCTIONAL ABILITY STATUS  Functional Mobility/Gait Assessment  Gait Assistance: Moderate assistance  Distance (ft): 5  Assistive Device: Rolling walker  Pattern: L Decreased stance time;Shuffle  Rolling:  NT  Supine to Sit: moderate assist  Sit to Supine:  NT  Sit to Stand: moderate assist x 2    Skilled Therapy Provided: chart reviewed and RN approved session. Patient agreeable for therapy activity. Patient inc time to complete task with bed mobility and transfers d/t inc pain on LLE and weakness. Patient sit few minutes at EOB without c/o and stand up with RW with cues for safety. She was able to take few small shuffling steps from bed to chair. MD visited patient during session, patient on 2 LO2 with 98% sat was transfer to  with 95% sat  with activity.     The patient's Approx Degree of Impairment: 72.57% has been calculated based on documentation in the [facility] '6 clicks' Inpatient Daily Activity Short Form.  Research supports that patients with this level of impairment may benefit from IR.  Final disposition will be made by interdisciplinary medical team.    THERAPEUTIC EXERCISES  Lower Extremity Alternating marching  Ankle pumps  Heel slides  Knee extension  Quad sets     Position Sitting and Supine       Patient End of Session: Up in chair    CURRENT GOALS   Goals to be met by: 1/10/25  Patient Goal Patient's self-stated goal is: to get better and be independent   Goal #1 Patient is able to demonstrate supine - sit EOB @ level: minimal assistance   Goal #1   Current Status  in progress mod A   Goal #2 Patient is able to demonstrate transfers Sit to/from Stand at assistance level: minimal assistance with RW      Goal #2  Current Status  in progress   Goal #3 Patient is able to ambulate 50 feet with RW at assistance level: minimal assistance   Goal #3   Current Status  in progress Mod A x 1-2 5 ft RW   Goal #4 Patient will negotiate bed to/from chair transfers with a RW with minimal assistance   Goal #4   Current Status  in progress mod A x 1-2   Goal #5 Patient verbalizes and/or demonstrates all precautions and safety concerns independently   Goal #5   Current Status instructed   Goal #6 Patient independently performs home exercise program for ROM/strengthening per the instructions provided in preparation for discharge.   Goal #6  Current Status instructed     Gait Trainin minutes  Therapeutic Activity: 20 minutes  Neuromuscular Re-education:  minutes  Therapeutic Exercise:  10 minutes  [place] Repositioning:  minutes  Manual Therapy:  minutes  Can add/delete any of these

## 2025-01-04 NOTE — PLAN OF CARE
Pt a/o x4. On 2L nasal cannula, using IS. CPAP while sleeping. On remote tele. [place] in place. SCDs on bilaterally. Aquacel dressings to hip incisions. Motrin or tylenol only for pain PRN. 0.9 running at 75 ml/hr. She is WBAT, two assist to chair. Plan for acute rehab at discharge pending bed availability and clearance.      Problem: PAIN - ADULT  Goal: Verbalizes/displays adequate comfort level or patient's stated pain goal  Description: INTERVENTIONS:  - Encourage pt to monitor pain and request assistance  - Assess pain using appropriate pain scale  - Administer analgesics based on type and severity of pain and evaluate response  - Implement non-pharmacological measures as appropriate and evaluate response  - Consider cultural and social influences on pain and pain management  - Manage/alleviate anxiety  - Utilize distraction and/or relaxation techniques  - Monitor for opioid side effects  - Notify MD/LIP if interventions unsuccessful or patient reports new pain  - Anticipate increased pain with activity and pre-medicate as appropriate  Outcome: Progressing     Problem: RISK FOR INFECTION - ADULT  Goal: Absence of fever/infection during anticipated neutropenic period  Description: INTERVENTIONS  - Monitor WBC  - Administer growth factors as ordered  - Implement neutropenic guidelines  Outcome: Progressing     Problem: SAFETY ADULT - FALL  Goal: Free from fall injury  Description: INTERVENTIONS:  - Assess pt frequently for physical needs  - Identify cognitive and physical deficits and behaviors that affect risk of falls.  - [place] fall precautions as indicated by assessment.  - Educate pt/family on patient safety including physical limitations  - Instruct pt to call for assistance with activity based on assessment  - Modify environment to reduce risk of injury  - Provide assistive devices as appropriate  - Consider OT/PT consult to assist with strengthening/mobility  - Encourage toileting schedule  Outcome:  Progressing     Problem: DISCHARGE PLANNING  Goal: Discharge to home or other facility with appropriate resources  Description: INTERVENTIONS:  - Identify barriers to discharge w/pt and caregiver  - Include patient/family/discharge partner in discharge planning  - Arrange for needed discharge resources and transportation as appropriate  - Identify discharge learning needs (meds, wound care, etc)  - Arrange for interpreters to assist at discharge as needed  - Consider post-discharge preferences of patient/family/discharge partner  - Complete POLST form as appropriate  - Assess patient's ability to be responsible for managing their own health  - Refer to Case Management Department for coordinating discharge planning if the patient needs post-hospital services based on physician/LIP order or complex needs related to functional status, cognitive ability or social support system  Outcome: Progressing     Problem: SKIN/TISSUE INTEGRITY - ADULT  Goal: Incision(s), wounds(s) or drain site(s) healing without S/S of infection  Description: INTERVENTIONS:  - Assess and document risk factors for pressure ulcer development  - Assess and document skin integrity  - Assess and document dressing/incision, wound bed, drain sites and surrounding tissue  - Implement wound care per orders  - Initiate isolation precautions as appropriate  - Initiate Pressure Ulcer prevention bundle as indicated  Outcome: Progressing     Problem: MUSCULOSKELETAL - ADULT  Goal: Return mobility to safest level of function  Description: INTERVENTIONS:  - Assess patient stability and activity tolerance for standing, transferring and ambulating w/ or w/o assistive devices  - Assist with transfers and ambulation using safe patient handling equipment as needed  - Ensure adequate protection for wounds/incisions during mobilization  - Obtain PT/OT consults as needed  - Advance activity as appropriate  - Communicate ordered activity level and limitations with  patient/family  Outcome: Progressing  Goal: Maintain proper alignment of affected body part  Description: INTERVENTIONS:  - Support and protect limb and body alignment per provider's orders  - Instruct and reinforce with patient and family use of appropriate assistive device and precautions (e.g. spinal or hip dislocation precautions)  Outcome: Progressing

## 2025-01-05 LAB
ANION GAP SERPL CALC-SCNC: 7 MMOL/L (ref 0–18)
BASOPHILS # BLD AUTO: 0.05 X10(3) UL (ref 0–0.2)
BASOPHILS NFR BLD AUTO: 1.2 %
BUN BLD-MCNC: 10 MG/DL (ref 9–23)
BUN/CREAT SERPL: 24.4 (ref 10–20)
CALCIUM BLD-MCNC: 7.9 MG/DL (ref 8.7–10.4)
CHLORIDE SERPL-SCNC: 109 MMOL/L (ref 98–112)
CO2 SERPL-SCNC: 28 MMOL/L (ref 21–32)
CREAT BLD-MCNC: 0.41 MG/DL
DEPRECATED RDW RBC AUTO: 46.1 FL (ref 35.1–46.3)
EGFRCR SERPLBLD CKD-EPI 2021: 108 ML/MIN/1.73M2 (ref 60–?)
EOSINOPHIL # BLD AUTO: 0.18 X10(3) UL (ref 0–0.7)
EOSINOPHIL NFR BLD AUTO: 4.2 %
ERYTHROCYTE [DISTWIDTH] IN BLOOD BY AUTOMATED COUNT: 14 % (ref 11–15)
GLUCOSE BLD-MCNC: 90 MG/DL (ref 70–99)
HCT VFR BLD AUTO: 22 %
HGB BLD-MCNC: 7.4 G/DL
IMM GRANULOCYTES # BLD AUTO: 0.01 X10(3) UL (ref 0–1)
IMM GRANULOCYTES NFR BLD: 0.2 %
LYMPHOCYTES # BLD AUTO: 1.32 X10(3) UL (ref 1–4)
LYMPHOCYTES NFR BLD AUTO: 30.7 %
MCH RBC QN AUTO: 30.8 PG (ref 26–34)
MCHC RBC AUTO-ENTMCNC: 33.6 G/DL (ref 31–37)
MCV RBC AUTO: 91.7 FL
MONOCYTES # BLD AUTO: 0.87 X10(3) UL (ref 0.1–1)
MONOCYTES NFR BLD AUTO: 20.2 %
NEUTROPHILS # BLD AUTO: 1.87 X10 (3) UL (ref 1.5–7.7)
NEUTROPHILS # BLD AUTO: 1.87 X10(3) UL (ref 1.5–7.7)
NEUTROPHILS NFR BLD AUTO: 43.5 %
OSMOLALITY SERPL CALC.SUM OF ELEC: 297 MOSM/KG (ref 275–295)
PHOSPHATE SERPL-MCNC: 3.3 MG/DL (ref 2.4–5.1)
PLATELET # BLD AUTO: 105 10(3)UL (ref 150–450)
POTASSIUM SERPL-SCNC: 3.4 MMOL/L (ref 3.5–5.1)
RBC # BLD AUTO: 2.4 X10(6)UL
SODIUM SERPL-SCNC: 144 MMOL/L (ref 136–145)
WBC # BLD AUTO: 4.3 X10(3) UL (ref 4–11)

## 2025-01-05 PROCEDURE — 99232 SBSQ HOSP IP/OBS MODERATE 35: CPT | Performed by: STUDENT IN AN ORGANIZED HEALTH CARE EDUCATION/TRAINING PROGRAM

## 2025-01-05 RX ORDER — POTASSIUM CHLORIDE 1500 MG/1
40 TABLET, EXTENDED RELEASE ORAL ONCE
Status: COMPLETED | OUTPATIENT
Start: 2025-01-05 | End: 2025-01-05

## 2025-01-05 NOTE — PHYSICAL THERAPY NOTE
PHYSICAL THERAPY TREATMENT NOTE - INPATIENT     Room Number: 436/436-A       Presenting Problem: fall onto left side w/resultant closed displaced L femur fx s/p ORIF, WBAT  Co-Morbidities : multiple myeloma, HTN, morbid obesity, SLOAN< OA, DJD, iBS, R TKA, lumbar lami    Problem List  Principal Problem:    Other closed fracture of left femur, unspecified portion of femur, initial encounter ([facility])  Active Problems:    Closed fracture of proximal end of femur ([facility])      PHYSICAL THERAPY ASSESSMENT   Patient demonstrates fair progress this session, goals  remain in progress.      Patient is requiring moderate assist as a result of the following impairments: decreased functional strength, decreased endurance/aerobic capacity, pain, and impaired standing balance.     Patient continues to function below baseline with bed mobility, transfers, gait, stair negotiation, and standing prolonged periods.  Next session anticipate patient to progress bed mobility, transfers, and gait.  Physical Therapy will continue to follow patient for duration of hospitalization.    Patient continues to benefit from continued skilled PT services: to promote return to prior level of function and safety with continuous assistance and intensive rehabilitative therapy .    PLAN DURING HOSPITALIZATION  Nursing Mobility Recommendation : 1 Assist  PT Device Recommendation: Rolling walker  PT Treatment Plan: Gait training;Family education;Strengthening;Transfer training;Bed mobility  Frequency (Obs): Daily     SUBJECTIVE  \"I can't walk too far\"    OBJECTIVE  Precautions: Limb alert - left;Bed/chair alarm    WEIGHT BEARING RESTRICTION  L Lower Extremity: Weight Bearing as Tolerated      PAIN ASSESSMENT   Rating: 3  Location: L hip  Management Techniques: Activity promotion;Repositioning    BALANCE  Static Sitting: Good  Dynamic Sitting: Good  Static Standing: Fair -  Dynamic Standing: Poor +    ACTIVITY TOLERANCE  Pulse: 83  Heart Rate Source: Monitor      BP: (!) 167/78  BP Location: Right arm  BP Method: Automatic  Patient Position: Sitting     O2 WALK  Oxygen Therapy  SPO2% on Room Air at Rest: 95    AM-PAC '6-Clicks' INPATIENT SHORT FORM - BASIC MOBILITY  How much difficulty does the patient currently have...  Patient Difficulty: Turning over in bed (including adjusting bedclothes, sheets and blankets)?: A Lot   Patient Difficulty: Sitting down on and standing up from a chair with arms (e.g., wheelchair, bedside commode, etc.): A Lot   Patient Difficulty: Moving from lying on back to sitting on the side of the bed?: A Lot   How much help from another person does the patient currently need...   Help from Another: Moving to and from a bed to a chair (including a wheelchair)?: A Lot   Help from Another: Need to walk in hospital room?: A Lot   Help from Another: Climbing 3-5 steps with a railing?: Total     AM-PAC Score:  Raw Score: 11   Approx Degree of Impairment: 72.57%   Standardized Score (AM-PAC Scale): 33.86   CMS Modifier (G-Code): CL    FUNCTIONAL ABILITY STATUS  Functional Mobility/Gait Assessment  Gait Assistance: Moderate assistance  Distance (ft): 3 ft x2  Assistive Device: Rolling walker  Pattern: L Decreased stance time;Shuffle  Sit to Stand: moderate assist    Skilled Therapy Provided: pt received in the chair. RN approved activity. Pt participated well with PT. However, with limited activity tolerance and also limited by impaired balance and L hip pain. Able to stand with mod assist and ambulated 3 ft x2 with RW, mod assist. Required chair follow for safety. Pt demonstrated difficulty weight shifting, initiating LLE step    The patient's Approx Degree of Impairment: 72.57% has been calculated based on documentation in the [facility] '6 clicks' Inpatient Daily Activity Short Form.  Research supports that patients with this level of impairment may benefit from  intensive rehab.  Final disposition will be made by interdisciplinary medical  team.    THERAPEUTIC EXERCISES  Lower Extremity Ankle pumps  Glut sets  Knee extension     Position Sitting       Patient End of Session: Up in chair    CURRENT GOALS   Goals to be met by: 1/10/25  Patient Goal Patient's self-stated goal is: to get better and be independent   Goal #1 Patient is able to demonstrate supine - sit EOB @ level: minimal assistance   Goal #1   Current Status  in progress   Goal #2 Patient is able to demonstrate transfers Sit to/from Stand at assistance level: minimal assistance with RW      Goal #2  Current Status  sit to stand with RW mod assist   Goal #3 Patient is able to ambulate 50 feet with RW at assistance level: minimal assistance   Goal #3   Current Status  3 ft x2 with RW, mod assist   Goal #4 Patient will negotiate bed to/from chair transfers with a RW with minimal assistance   Goal #4   Current Status  in progress    Goal #5 Patient verbalizes and/or demonstrates all precautions and safety concerns independently   Goal #5   Current Status instructed   Goal #6 Patient independently performs home exercise program for ROM/strengthening per the instructions provided in preparation for discharge.   Goal #6  Current Status instructed     Gait Training: 15 minutes  Therapeutic Exercise: 10 minutes

## 2025-01-05 NOTE — PLAN OF CARE
Problem: PAIN - ADULT  Goal: Verbalizes/displays adequate comfort level or patient's stated pain goal  Description: INTERVENTIONS:  - Encourage pt to monitor pain and request assistance  - Assess pain using appropriate pain scale  - Administer analgesics based on type and severity of pain and evaluate response  - Implement non-pharmacological measures as appropriate and evaluate response  - Consider cultural and social influences on pain and pain management  - Manage/alleviate anxiety  - Utilize distraction and/or relaxation techniques  - Monitor for opioid side effects  - Notify MD/LIP if interventions unsuccessful or patient reports new pain  - Anticipate increased pain with activity and pre-medicate as appropriate  Outcome: Progressing     Problem: RISK FOR INFECTION - ADULT  Goal: Absence of fever/infection during anticipated neutropenic period  Description: INTERVENTIONS  - Monitor WBC  - Administer growth factors as ordered  - Implement neutropenic guidelines  Outcome: Progressing     Problem: SAFETY ADULT - FALL  Goal: Free from fall injury  Description: INTERVENTIONS:  - Assess pt frequently for physical needs  - Identify cognitive and physical deficits and behaviors that affect risk of falls.  - [place] fall precautions as indicated by assessment.  - Educate pt/family on patient safety including physical limitations  - Instruct pt to call for assistance with activity based on assessment  - Modify environment to reduce risk of injury  - Provide assistive devices as appropriate  - Consider OT/PT consult to assist with strengthening/mobility  - Encourage toileting schedule  Outcome: Progressing     Problem: DISCHARGE PLANNING  Goal: Discharge to home or other facility with appropriate resources  Description: INTERVENTIONS:  - Identify barriers to discharge w/pt and caregiver  - Include patient/family/discharge partner in discharge planning  - Arrange for needed discharge resources and transportation as  appropriate  - Identify discharge learning needs (meds, wound care, etc)  - Arrange for interpreters to assist at discharge as needed  - Consider post-discharge preferences of patient/family/discharge partner  - Complete POLST form as appropriate  - Assess patient's ability to be responsible for managing their own health  - Refer to Case Management Department for coordinating discharge planning if the patient needs post-hospital services based on physician/LIP order or complex needs related to functional status, cognitive ability or social support system  Outcome: Progressing     Problem: SKIN/TISSUE INTEGRITY - ADULT  Goal: Incision(s), wounds(s) or drain site(s) healing without S/S of infection  Description: INTERVENTIONS:  - Assess and document risk factors for pressure ulcer development  - Assess and document skin integrity  - Assess and document dressing/incision, wound bed, drain sites and surrounding tissue  - Implement wound care per orders  - Initiate isolation precautions as appropriate  - Initiate Pressure Ulcer prevention bundle as indicated  Outcome: Progressing     Problem: MUSCULOSKELETAL - ADULT  Goal: Return mobility to safest level of function  Description: INTERVENTIONS:  - Assess patient stability and activity tolerance for standing, transferring and ambulating w/ or w/o assistive devices  - Assist with transfers and ambulation using safe patient handling equipment as needed  - Ensure adequate protection for wounds/incisions during mobilization  - Obtain PT/OT consults as needed  - Advance activity as appropriate  - Communicate ordered activity level and limitations with patient/family  Outcome: Progressing  Goal: Maintain proper alignment of affected body part  Description: INTERVENTIONS:  - Support and protect limb and body alignment per provider's orders  - Instruct and reinforce with patient and family use of appropriate assistive device and precautions (e.g. spinal or hip dislocation  precautions)  Outcome: Progressing

## 2025-01-05 NOTE — PLAN OF CARE
Patient is a&o x4. Room air. Up to chair with PT today. Pain being managed with tylenol. Patient has history of SLOAN and has CPAP here from home. L. Femur immobilizer on and up with rolling chair. Patient has a port and is a unit draw. Oseguera in place for retention. Plan is to go to acute care rehab when bed available.  Problem: PAIN - ADULT  Goal: Verbalizes/displays adequate comfort level or patient's stated pain goal  Description: INTERVENTIONS:  - Encourage pt to monitor pain and request assistance  - Assess pain using appropriate pain scale  - Administer analgesics based on type and severity of pain and evaluate response  - Implement non-pharmacological measures as appropriate and evaluate response  - Consider cultural and social influences on pain and pain management  - Manage/alleviate anxiety  - Utilize distraction and/or relaxation techniques  - Monitor for opioid side effects  - Notify MD/LIP if interventions unsuccessful or patient reports new pain  - Anticipate increased pain with activity and pre-medicate as appropriate  1/5/2025 1745 by Jerilyn Valdovinos RN  Outcome: Progressing  1/5/2025 1200 by Jerilyn Valdovinos RN  Outcome: Progressing     Problem: RISK FOR INFECTION - ADULT  Goal: Absence of fever/infection during anticipated neutropenic period  Description: INTERVENTIONS  - Monitor WBC  - Administer growth factors as ordered  - Implement neutropenic guidelines  1/5/2025 1745 by Jerilyn Valdovinos RN  Outcome: Progressing  1/5/2025 1200 by Jerilyn Valdovinos RN  Outcome: Progressing     Problem: SAFETY ADULT - FALL  Goal: Free from fall injury  Description: INTERVENTIONS:  - Assess pt frequently for physical needs  - Identify cognitive and physical deficits and behaviors that affect risk of falls.  - [place] fall precautions as indicated by assessment.  - Educate pt/family on patient safety including physical limitations  - Instruct pt to call for assistance with activity based on assessment  - Modify environment to reduce  risk of injury  - Provide assistive devices as appropriate  - Consider OT/PT consult to assist with strengthening/mobility  - Encourage toileting schedule  1/5/2025 1745 by Jerilyn Valdovinos RN  Outcome: Progressing  1/5/2025 1200 by Jerilyn Valdovinos RN  Outcome: Progressing     Problem: DISCHARGE PLANNING  Goal: Discharge to home or other facility with appropriate resources  Description: INTERVENTIONS:  - Identify barriers to discharge w/pt and caregiver  - Include patient/family/discharge partner in discharge planning  - Arrange for needed discharge resources and transportation as appropriate  - Identify discharge learning needs (meds, wound care, etc)  - Arrange for interpreters to assist at discharge as needed  - Consider post-discharge preferences of patient/family/discharge partner  - Complete POLST form as appropriate  - Assess patient's ability to be responsible for managing their own health  - Refer to Case Management Department for coordinating discharge planning if the patient needs post-hospital services based on physician/LIP order or complex needs related to functional status, cognitive ability or social support system  1/5/2025 1745 by Jerilyn Valdovinos RN  Outcome: Progressing  1/5/2025 1200 by Jerilyn Valdovinos RN  Outcome: Progressing     Problem: SKIN/TISSUE INTEGRITY - ADULT  Goal: Incision(s), wounds(s) or drain site(s) healing without S/S of infection  Description: INTERVENTIONS:  - Assess and document risk factors for pressure ulcer development  - Assess and document skin integrity  - Assess and document dressing/incision, wound bed, drain sites and surrounding tissue  - Implement wound care per orders  - Initiate isolation precautions as appropriate  - Initiate Pressure Ulcer prevention bundle as indicated  1/5/2025 1745 by Jerilyn Valdovinos RN  Outcome: Progressing  1/5/2025 1200 by Jerilyn Valdovinos RN  Outcome: Progressing     Problem: MUSCULOSKELETAL - ADULT  Goal: Return mobility to safest level of function  Description:  INTERVENTIONS:  - Assess patient stability and activity tolerance for standing, transferring and ambulating w/ or w/o assistive devices  - Assist with transfers and ambulation using safe patient handling equipment as needed  - Ensure adequate protection for wounds/incisions during mobilization  - Obtain PT/OT consults as needed  - Advance activity as appropriate  - Communicate ordered activity level and limitations with patient/family  1/5/2025 1745 by Jerilyn Valdovinos RN  Outcome: Progressing  1/5/2025 1200 by Jerilyn Valdovinos RN  Outcome: Progressing  Goal: Maintain proper alignment of affected body part  Description: INTERVENTIONS:  - Support and protect limb and body alignment per provider's orders  - Instruct and reinforce with patient and family use of appropriate assistive device and precautions (e.g. spinal or hip dislocation precautions)  1/5/2025 1745 by Jerilyn Valdovinos RN  Outcome: Progressing  1/5/2025 1200 by Jerilyn Valdovinos RN  Outcome: Progressing

## 2025-01-05 NOTE — PROGRESS NOTES
[facility]  part of [facility]    Progress Note    Jackelin Mackey Patient Status:  Inpatient    1955 MRN [id number]   Location [facility] 4W/SW/SE Attending Annabella Ortiz MD   Hosp Day # 5 PCP Colby Pandya MD, MD     Subjective:     Patient seen and examined.  Denied transfusion reaction or issues yesterday, hgb 7.9 today.  In good spirits, about to work with PT.       Objective:   Blood pressure 156/82, pulse 84, temperature 98.7 °F (37.1 °C), temperature source Oral, resp. rate 18, height 5' (1.524 m), weight 233 lb (105.7 kg), SpO2 95%.  Physical Exam    General: Obese female, NAD. Patient is alert and oriented   HEENT: EOMI PERRLA, atraumatic normocephalic  Cardiac: S1-S2 appreciated  Lungs: Good air entry bilaterally clear to auscultation  Abdomen: Soft nontender nondistended positive bowel sounds  Ext: Peripheral pulses are positive  Neuro: No focal deficits noted  Psych: Normal mood        Results:   Lab Results   Component Value Date    WBC 5.5 2025    HGB 7.9 (L) 2025    HCT 22.9 (L) 2025    .0 (L) 2025    CREATSERUM 0.44 (L) 2025    BUN 9 2025     2025    K 3.7 2025    K 3.7 2025     2025    CO2 27.0 2025    GLU 87 2025    CA 7.8 (L) 2025    ALB 4.0 10/27/2020    ALKPHO 62 10/27/2020    BILT 0.54 10/27/2020    TP 6.4 10/27/2020    AST 21 10/27/2020    ALT 21 10/27/2020    MG 1.9 2025    PHOS 2.4 2025       No results found.        Assessment & Plan:       Acute displaced left proximal femur shaft fracture after a mechanical fall.  - s/p Left hip long IM nail on   - cont pain control as needed   - Encourage Incentive spirometry  - PT/OT per Ortho, recommending rehab placement  - F/u further Ortho recs  -PMR consulted, appreciate recommendations     Syncopal Episode  - experienced postoperatively in hosptial- was with hospital staff in  bathroom. Thought from hypovolemia and anemia. Improved after IVFs.     HTN  - controlled  - CPM  - Monitor and adjust as needed       Morbid obesity.     Obstructive sleep apnea.     Multiple myeloma  -status post autologous stem cell transplant  -  holding pt's pomalidomide . Pt left message for her onc team at [facility]   - Anemia hgb 6.6 on 1/3 postoperatively. Antibodies noted, incompatible crossmatch. Discussed with hem, transfusion given with no concerns. Hemoglobin responded appropriately.      Urinary Retention  - indwelling choi placed after several straight caths  - now having BMs     SLOAN  - uses CPAP, o2 PRN for desats       DVT Prophylaxis: Aspirin per surgery  CODE status: Full  Dispo: pending rehab placement       MDM: High, severe exacerbation of chronic illness posing a threat to life. IV medications requiring close inpatient monitoring.     Dedra Chavira M.D.

## 2025-01-05 NOTE — PLAN OF CARE
Problem: Patient Centered Care  Goal: Patient preferences are identified and integrated in the patient's plan of care  Description: Interventions:  - What would you like us to know as we care for you? I'm prone to nausea  - Provide timely, complete, and accurate information to patient/family  - Incorporate patient and family knowledge, values, beliefs, and cultural backgrounds into the planning and delivery of care  - Encourage patient/family to participate in care and decision-making at the level they choose  - Honor patient and family perspectives and choices  Outcome: Progressing     Problem: Patient/Family Goals  Goal: Patient/Family Long Term Goal  Description: Patient's Long Term Goal: Discharge home    Interventions:  - Manage pain, increase activity out of bed  - See additional Care Plan goals for specific interventions  Outcome: Progressing  Goal: Patient/Family Short Term Goal  Description: Patient's Short Term Goal: Not be nauseated    Interventions:   - PRN antiemetics as ordered. Dr. Downs aware  - See additional Care Plan goals for specific interventions  Outcome: Progressing     Problem: PAIN - ADULT  Goal: Verbalizes/displays adequate comfort level or patient's stated pain goal  Description: INTERVENTIONS:  - Encourage pt to monitor pain and request assistance  - Assess pain using appropriate pain scale  - Administer analgesics based on type and severity of pain and evaluate response  - Implement non-pharmacological measures as appropriate and evaluate response  - Consider cultural and social influences on pain and pain management  - Manage/alleviate anxiety  - Utilize distraction and/or relaxation techniques  - Monitor for opioid side effects  - Notify MD/LIP if interventions unsuccessful or patient reports new pain  - Anticipate increased pain with activity and pre-medicate as appropriate  Outcome: Progressing     Problem: RISK FOR INFECTION - ADULT  Goal: Absence of fever/infection during  anticipated neutropenic period  Description: INTERVENTIONS  - Monitor WBC  - Administer growth factors as ordered  - Implement neutropenic guidelines  Outcome: Progressing     Problem: SAFETY ADULT - FALL  Goal: Free from fall injury  Description: INTERVENTIONS:  - Assess pt frequently for physical needs  - Identify cognitive and physical deficits and behaviors that affect risk of falls.  - [place] fall precautions as indicated by assessment.  - Educate pt/family on patient safety including physical limitations  - Instruct pt to call for assistance with activity based on assessment  - Modify environment to reduce risk of injury  - Provide assistive devices as appropriate  - Consider OT/PT consult to assist with strengthening/mobility  - Encourage toileting schedule  Outcome: Progressing     Problem: DISCHARGE PLANNING  Goal: Discharge to home or other facility with appropriate resources  Description: INTERVENTIONS:  - Identify barriers to discharge w/pt and caregiver  - Include patient/family/discharge partner in discharge planning  - Arrange for needed discharge resources and transportation as appropriate  - Identify discharge learning needs (meds, wound care, etc)  - Arrange for interpreters to assist at discharge as needed  - Consider post-discharge preferences of patient/family/discharge partner  - Complete POLST form as appropriate  - Assess patient's ability to be responsible for managing their own health  - Refer to Case Management Department for coordinating discharge planning if the patient needs post-hospital services based on physician/LIP order or complex needs related to functional status, cognitive ability or social support system  Outcome: Progressing     Problem: SKIN/TISSUE INTEGRITY - ADULT  Goal: Incision(s), wounds(s) or drain site(s) healing without S/S of infection  Description: INTERVENTIONS:  - Assess and document risk factors for pressure ulcer development  - Assess and document skin  integrity  - Assess and document dressing/incision, wound bed, drain sites and surrounding tissue  - Implement wound care per orders  - Initiate isolation precautions as appropriate  - Initiate Pressure Ulcer prevention bundle as indicated  Outcome: Progressing     Problem: MUSCULOSKELETAL - ADULT  Goal: Return mobility to safest level of function  Description: INTERVENTIONS:  - Assess patient stability and activity tolerance for standing, transferring and ambulating w/ or w/o assistive devices  - Assist with transfers and ambulation using safe patient handling equipment as needed  - Ensure adequate protection for wounds/incisions during mobilization  - Obtain PT/OT consults as needed  - Advance activity as appropriate  - Communicate ordered activity level and limitations with patient/family  Outcome: Progressing  Goal: Maintain proper alignment of affected body part  Description: INTERVENTIONS:  - Support and protect limb and body alignment per provider's orders  - Instruct and reinforce with patient and family use of appropriate assistive device and precautions (e.g. spinal or hip dislocation precautions)  Outcome: Progressing

## 2025-01-05 NOTE — PROGRESS NOTES
[facility]  part of [facility]    Progress Note    Jackelin Mackey Patient Status:  Inpatient    1955 MRN [id number]   Location [facility] 4W/SW/SE Attending Dedra Chavira MD     Hosp Day # 6 PCP Colby Pandya MD, MD     Subjective:   Patient seen and examined.  No acute complaints. Awaiting placement.      Objective:   Blood pressure 135/90, pulse 71, temperature 97.7 °F (36.5 °C), temperature source Oral, resp. rate 18, height 5' (1.524 m), weight 233 lb (105.7 kg), SpO2 95%.  Physical Exam    General: Obese female, NAD. Patient is alert and oriented   HEENT: EOMI PERRLA, atraumatic normocephalic  Cardiac: S1-S2 appreciated  Lungs: Good air entry bilaterally clear to auscultation  Abdomen: Soft nontender nondistended positive bowel sounds  Ext: Peripheral pulses are positive  Neuro: No focal deficits noted  Psych: Normal mood        Results:   Lab Results   Component Value Date    WBC 4.3 2025    HGB 7.4 (L) 2025    HCT 22.0 (L) 2025    .0 (L) 2025    CREATSERUM 0.41 (L) 2025    BUN 10 2025     2025    K 3.4 (L) 2025     2025    CO2 28.0 2025    GLU 90 2025    CA 7.9 (L) 2025    ALB 4.0 10/27/2020    ALKPHO 62 10/27/2020    BILT 0.54 10/27/2020    TP 6.4 10/27/2020    AST 21 10/27/2020    ALT 21 10/27/2020    MG 1.9 2025    PHOS 3.3 2025       No results found.        Assessment & Plan:       Acute displaced left proximal femur shaft fracture after a mechanical fall.  - s/p Left hip long IM nail on   - cont pain control as needed   - Encourage Incentive spirometry  - PT/OT per Ortho, recommending rehab placement  - F/u further Ortho recs  -PMR consulted, appreciate recommendations     Syncopal Episode  - experienced postoperatively in hosptial- was with hospital staff in bathroom. Thought from hypovolemia and anemia. Improved after IVFs.     HTN  -  controlled  - CPM  - Monitor and adjust as needed       Morbid obesity.     Obstructive sleep apnea.     Multiple myeloma  -status post autologous stem cell transplant  -  holding pt's pomalidomide . Pt left message for her onc team at [facility]   - Anemia hgb 6.6 on 1/3 postoperatively. Antibodies noted, incompatible crossmatch. Discussed with hem, transfusion given with no concerns. Hemoglobin responded appropriately.      Urinary Retention  - indwelling choi placed after several straight caths  - now having BMs     SLOAN  - uses CPAP, o2 PRN for desats       DVT Prophylaxis: Aspirin per surgery  CODE status: Full  Dispo: pending rehab placement       MDM: Moderate    I personally spent time on chart/note review, review of labs/imaging, discussion with patient, physical exam, discussion with staff, consultants, coordinating care, writing progress note, and discussion of plan of care.     Dedra Chavira M.D.

## 2025-01-05 NOTE — PROGRESS NOTES
[facility]  part of [facility]    Progress Note    Jackelin Mackey Patient Status:  Inpatient    1955 MRN [id number]   Location [facility] 4W/SW/SE Attending Dedra Chavira MD   Hosp Day # 6 PCP Colby Pandya MD, MD     Chief Complaint: s/p left hip IM nail 2024    Subjective:     Constitutional:         Pt feels good this am.  No sig pain complaints.  Is able to ambulate to bathroom with walker.  Awaiting rehab.        Objective:   Physical Exam  Vitals reviewed.       Left Hip Exam     Comments:  Pt in NAD.  VSS.  NV status B LE intact. No calf tenderness.  Chi's sign negative.  DF/PF  B LE.  Dressing dry and intact.  Thigh with some min swelling consistent with post op status.   HGb is 7.4 this am, down from 7.9 yesterday.                Results:   Lab Results   Component Value Date    WBC 4.3 2025    HGB 7.4 (L) 2025    HCT 22.0 (L) 2025    .0 (L) 2025    CREATSERUM 0.41 (L) 2025    BUN 10 2025     2025    K 3.4 (L) 2025     2025    CO2 28.0 2025    GLU 90 2025    CA 7.9 (L) 2025    ALB 4.0 10/27/2020    ALKPHO 62 10/27/2020    BILT 0.54 10/27/2020    TP 6.4 10/27/2020    AST 21 10/27/2020    ALT 21 10/27/2020    MG 1.9 2025    PHOS 3.3 2025       No results found.        Assessment & Plan:     Other closed fracture of left femur, unspecified portion of femur, initial encounter (HCC)   Cont PT  Discharge to rehab when bed available.       Closed fracture of proximal end of femur (HCC)  As above        Johnna Jacobs PA-C  2025

## 2025-01-06 ENCOUNTER — APPOINTMENT (OUTPATIENT)
Dept: ULTRASOUND IMAGING | Facility: HOSPITAL | Age: 70
DRG: 481 | End: 2025-01-06
Attending: STUDENT IN AN ORGANIZED HEALTH CARE EDUCATION/TRAINING PROGRAM
Payer: MEDICARE

## 2025-01-06 VITALS
OXYGEN SATURATION: 95 % | TEMPERATURE: 98 F | RESPIRATION RATE: 16 BRPM | BODY MASS INDEX: 45.75 KG/M2 | DIASTOLIC BLOOD PRESSURE: 64 MMHG | HEART RATE: 71 BPM | WEIGHT: 233 LBS | HEIGHT: 60 IN | SYSTOLIC BLOOD PRESSURE: 135 MMHG

## 2025-01-06 LAB
BASOPHILS # BLD AUTO: 0.06 X10(3) UL (ref 0–0.2)
BASOPHILS NFR BLD AUTO: 1.4 %
DEPRECATED RDW RBC AUTO: 47.5 FL (ref 35.1–46.3)
EOSINOPHIL # BLD AUTO: 0.12 X10(3) UL (ref 0–0.7)
EOSINOPHIL NFR BLD AUTO: 2.8 %
ERYTHROCYTE [DISTWIDTH] IN BLOOD BY AUTOMATED COUNT: 14 % (ref 11–15)
HCT VFR BLD AUTO: 25.6 %
HGB BLD-MCNC: 8.7 G/DL
IMM GRANULOCYTES # BLD AUTO: 0.03 X10(3) UL (ref 0–1)
IMM GRANULOCYTES NFR BLD: 0.7 %
LYMPHOCYTES # BLD AUTO: 1.15 X10(3) UL (ref 1–4)
LYMPHOCYTES NFR BLD AUTO: 27.1 %
MCH RBC QN AUTO: 32 PG (ref 26–34)
MCHC RBC AUTO-ENTMCNC: 34 G/DL (ref 31–37)
MCV RBC AUTO: 94.1 FL
MONOCYTES # BLD AUTO: 0.66 X10(3) UL (ref 0.1–1)
MONOCYTES NFR BLD AUTO: 15.5 %
NEUTROPHILS # BLD AUTO: 2.23 X10 (3) UL (ref 1.5–7.7)
NEUTROPHILS # BLD AUTO: 2.23 X10(3) UL (ref 1.5–7.7)
NEUTROPHILS NFR BLD AUTO: 52.5 %
PLATELET # BLD AUTO: 142 10(3)UL (ref 150–450)
POTASSIUM SERPL-SCNC: 3.8 MMOL/L (ref 3.5–5.1)
RBC # BLD AUTO: 2.72 X10(6)UL
WBC # BLD AUTO: 4.3 X10(3) UL (ref 4–11)

## 2025-01-06 PROCEDURE — 99239 HOSP IP/OBS DSCHRG MGMT >30: CPT | Performed by: STUDENT IN AN ORGANIZED HEALTH CARE EDUCATION/TRAINING PROGRAM

## 2025-01-06 PROCEDURE — 93970 EXTREMITY STUDY: CPT | Performed by: STUDENT IN AN ORGANIZED HEALTH CARE EDUCATION/TRAINING PROGRAM

## 2025-01-06 RX ORDER — PSEUDOEPHEDRINE HCL 30 MG
100 TABLET ORAL 2 TIMES DAILY
Qty: 14 CAPSULE | Refills: 0 | Status: SHIPPED | OUTPATIENT
Start: 2025-01-06 | End: 2025-01-13

## 2025-01-06 RX ORDER — ASPIRIN 81 MG/1
81 TABLET ORAL 2 TIMES DAILY
Qty: 60 TABLET | Refills: 0 | Status: SHIPPED | OUTPATIENT
Start: 2025-01-06 | End: 2025-02-05

## 2025-01-06 RX ORDER — IBUPROFEN 800 MG/1
800 TABLET, FILM COATED ORAL 3 TIMES DAILY PRN
Qty: 21 TABLET | Refills: 0 | Status: SHIPPED | OUTPATIENT
Start: 2025-01-06 | End: 2025-01-13

## 2025-01-06 RX ORDER — ACETAMINOPHEN 500 MG
500 TABLET ORAL EVERY 4 HOURS PRN
Status: DISCONTINUED | OUTPATIENT
Start: 2025-01-06 | End: 2025-01-06

## 2025-01-06 NOTE — PLAN OF CARE
Home with spouse. POD 6. Dressings changed. Cleared for discharge from all services. Report given to BENITO Goss. Plan for rehab via ambulance around 1730 this evening. Call light remains in reach, bed locked and in lowest position.     Problem: Patient Centered Care  Goal: Patient preferences are identified and integrated in the patient's plan of care  Description: Interventions:  - What would you like us to know as we care for you? I'm prone to nausea  - Provide timely, complete, and accurate information to patient/family  - Incorporate patient and family knowledge, values, beliefs, and cultural backgrounds into the planning and delivery of care  - Encourage patient/family to participate in care and decision-making at the level they choose  - Honor patient and family perspectives and choices  Outcome: Adequate for Discharge     Problem: Patient/Family Goals  Goal: Patient/Family Long Term Goal  Description: Patient's Long Term Goal: Discharge home    Interventions:  - Manage pain, increase activity out of bed  - See additional Care Plan goals for specific interventions  Outcome: Adequate for Discharge  Goal: Patient/Family Short Term Goal  Description: Patient's Short Term Goal: Not be nauseated    Interventions:   - PRN antiemetics as ordered. Dr. Downs aware  - See additional Care Plan goals for specific interventions  Outcome: Adequate for Discharge     Problem: PAIN - ADULT  Goal: Verbalizes/displays adequate comfort level or patient's stated pain goal  Description: INTERVENTIONS:  - Encourage pt to monitor pain and request assistance  - Assess pain using appropriate pain scale  - Administer analgesics based on type and severity of pain and evaluate response  - Implement non-pharmacological measures as appropriate and evaluate response  - Consider cultural and social influences on pain and pain management  - Manage/alleviate anxiety  - Utilize distraction and/or relaxation techniques  - Monitor for opioid  side effects  - Notify MD/LIP if interventions unsuccessful or patient reports new pain  - Anticipate increased pain with activity and pre-medicate as appropriate  Outcome: Adequate for Discharge     Problem: SAFETY ADULT - FALL  Goal: Free from fall injury  Description: INTERVENTIONS:  - Assess pt frequently for physical needs  - Identify cognitive and physical deficits and behaviors that affect risk of falls.  - Thurman fall precautions as indicated by assessment.  - Educate pt/family on patient safety including physical limitations  - Instruct pt to call for assistance with activity based on assessment  - Modify environment to reduce risk of injury  - Provide assistive devices as appropriate  - Consider OT/PT consult to assist with strengthening/mobility  - Encourage toileting schedule  Outcome: Adequate for Discharge     Problem: DISCHARGE PLANNING  Goal: Discharge to home or other facility with appropriate resources  Description: INTERVENTIONS:  - Identify barriers to discharge w/pt and caregiver  - Include patient/family/discharge partner in discharge planning  - Arrange for needed discharge resources and transportation as appropriate  - Identify discharge learning needs (meds, wound care, etc)  - Arrange for interpreters to assist at discharge as needed  - Consider post-discharge preferences of patient/family/discharge partner  - Complete POLST form as appropriate  - Assess patient's ability to be responsible for managing their own health  - Refer to Case Management Department for coordinating discharge planning if the patient needs post-hospital services based on physician/LIP order or complex needs related to functional status, cognitive ability or social support system  Outcome: Adequate for Discharge     Problem: SKIN/TISSUE INTEGRITY - ADULT  Goal: Incision(s), wounds(s) or drain site(s) healing without S/S of infection  Description: INTERVENTIONS:  - Assess and document risk factors for pressure ulcer  development  - Assess and document skin integrity  - Assess and document dressing/incision, wound bed, drain sites and surrounding tissue  - Implement wound care per orders  - Initiate isolation precautions as appropriate  - Initiate Pressure Ulcer prevention bundle as indicated  Outcome: Adequate for Discharge     Problem: MUSCULOSKELETAL - ADULT  Goal: Return mobility to safest level of function  Description: INTERVENTIONS:  - Assess patient stability and activity tolerance for standing, transferring and ambulating w/ or w/o assistive devices  - Assist with transfers and ambulation using safe patient handling equipment as needed  - Ensure adequate protection for wounds/incisions during mobilization  - Obtain PT/OT consults as needed  - Advance activity as appropriate  - Communicate ordered activity level and limitations with patient/family  Outcome: Adequate for Discharge  Goal: Maintain proper alignment of affected body part  Description: INTERVENTIONS:  - Support and protect limb and body alignment per provider's orders  - Instruct and reinforce with patient and family use of appropriate assistive device and precautions (e.g. spinal or hip dislocation precautions)  Outcome: Adequate for Discharge

## 2025-01-06 NOTE — SPIRITUAL CARE NOTE
Spiritual Care Visit Note    Patient Name: Jackelin Mackey Date of Spiritual Care Visit: 25   : 1955 Primary Dx: Other closed fracture of left femur, unspecified portion of femur, initial encounter ([facility])       Referred By: Referral From:     Spiritual Care Taxonomy:    Intended Effects: Demonstrate caring and concern    Methods: Collaborate with care team member;Offer support    Interventions: Active listening;Ask guided questions;Ask guided questions about the nature and presence of God;Discuss concerns;Silent prayer    Visit Type/Summary:     - Spiritual Care: Consulted with RN prior to visit. Offered empathic listening and emotional support. Provided information regarding how to contact Spiritual Care and left a Spiritual Care information card. Patient thanked writer for visit and doing better health ang.  remains available as needed for follow up.    JESSIE Pham CAMII   [id number]     Spiritual Care support can be requested via an [facility] consult. For urgent/immediate needs, please contact the On Call  at: [place]: ext [phone]

## 2025-01-06 NOTE — OCCUPATIONAL THERAPY NOTE
OCCUPATIONAL THERAPY TREATMENT NOTE - INPATIENT    Room Number: 436/436-A     Presenting Problem: L femur fx s/p orif     Problem List  Principal Problem:    Other closed fracture of left femur, unspecified portion of femur, initial encounter ([facility])  Active Problems:    Closed fracture of proximal end of femur ([facility])      OCCUPATIONAL THERAPY ASSESSMENT   Patient demonstrates good  progress this session, goals remain in progress.    Patient is requiring moderate assist as a result of the following impairments: decreased functional strength, decreased functional reach, and pain.    Patient continues to function below baseline with ADls and functional mobility.   Occupational Therapy will continue to follow patient for duration of hospitalization.    Patient continues to benefit from continued skilled OT services: to facilitate return to prior level of function as patient demonstrates high motivation with excellent tolerance to an intensive therapy program.     PLAN DURING HOSPITALIZATION  OT Device Recommendations: TBD  OT Treatment Plan: Compensatory technique education;Patient/Family training;Patient/Family education;Endurance training;Functional transfer training;ADL training;Balance activities     SUBJECTIVE  \"This wasn't easy before my fall\"- referring to reaching lower legs/feet    OBJECTIVE  Precautions: Limb alert - left;Bed/chair alarm    WEIGHT BEARING RESTRICTION  L Lower Extremity: Weight Bearing as Tolerated    PAIN ASSESSMENT  Rating: Unable to rate (\"its more sore than pain\")  Location: LT LE  Management Techniques: Relaxation; Repositioning    ACTIVITY TOLERANCE  Pt participated in bed side/in room activity with good tolerance- benefits from standing rest break with functional mobility, pacing, and use of AE for energy conservation      ACTIVITIES OF DAILY LIVING ASSESSMENT  AM-PAC ‘6-Clicks’ Inpatient Daily Activity Short Form  How much help from another person does the patient currently need…  -    Putting on and taking off regular lower body clothing?: A Lot  -   Bathing (including washing, rinsing, drying)?: A Lot  -   Toileting, which includes using toilet, bedpan or urinal? : A Lot  -   Putting on and taking off regular upper body clothing?: A Little  -   Taking care of personal grooming such as brushing teeth?: None  -   Eating meals?: None    AM-PAC Score:  Score: 17  Approx Degree of Impairment: 50.11%  Standardized Score (AM-PAC Scale): 37.26  CMS Modifier (G-Code): CK    FUNCTIONAL TRANSFER ASSESSMENT  Sit to Stand: Chair  Chair: Moderate Assist    BED MOBILITY  N/A pt received and returned to bed side chair    FUNCTIONAL ADL ASSESSMENT  Feeding: I  Grooming: set up from sitting at bed side  UE self care:set up from sitting  LE dressing: Mod A d/t limited functional reach, requiring assist for sit to stand.     Skilled Therapy Provided: Pt received sitting up in chair, recently medicated. No visitors present. Pt is in good spirits and motivated to participate. Pt demo progress with improved pain management, increased activity tolerance advancing from Ax2 for functional xfer to Mod A for sit to stand and ~20' with CGA and chair follow. Pt's participation in self care improved from max to Mod.    EDUCATION PROVIDED  Patient Education : Discharge Recommendations; Functional Transfer Techniques; Posture/Positioning (compensatory techniques)  Patient's Response to Education: Verbalized Understanding    The patient's Approx Degree of Impairment: 50.11% has been calculated based on documentation in the [facility] '6 clicks' Inpatient Daily Activity Short Form.  Research supports that patients with this level of impairment may benefit from acute rehab.  Final disposition will be made by interdisciplinary medical team.    Patient End of Session: Up in chair;Needs met;Call light within reach;All patient questions and concerns addressed    OT Goals:     Patient will complete LE dressing with min A, AE PRN  Comment:  provide mod A with limited functional reach    Patient will complete functional TFR with min A  Comment: provide Mod A for sit to stand     Patient will inc stand balance during ADL task to min A   Comment: good progress with pt managing functional mobility tasks using R/W with CGA and chair follow     Goals  on: 1/15/25  Frequency: 3-5xs/week    Self-Care Home Management: 25 minutes

## 2025-01-06 NOTE — CM/SW NOTE
CM fax updated clinical and PT notes to [facility].   Awaiting OT notes.     Spoke w/ Kimmy in admissions at [facility], she is aware of fax being sent. She will call this CM after OT notes received and pt has been approved.    1100: CM f/u with pt at bedside to discusss dc plan for today. Pt now states that she would like to go to [facility] on dc. [facility] have bad reviews per pt.    Call placed to Gillian liaison for [facility], she will confirm bed availability and call this CM back.    1230: return call received from Gillian. [facility] has a bed today however pt will need bi-lateral dopplers of lower ext  And DVT form filled out/ signed by MD.    Bi-lat lower ext dopplers ordered, DVT form on chart.      Cyndy Ramires RN, BSN  Nurse   [phone]

## 2025-01-06 NOTE — CM/SW NOTE
01/06/25 1500   Discharge disposition   Expected discharge disposition Rehab Facili   Post Acute Care Provider   ([facility])   Discharge transportation [facility]     Bi-lat US completed. (-) DVT.  DVT form completed.  Results and form attached to Sheila ref to [facility].    Call placed to Gillian, she will review and work on a bed.  Room: 19th floor  RN to call: [phone]    Plan\" BLS amb arranged for  today at 530pm to take pt to [facility].  PCS done.    / to remain available for support and/or discharge planning.   Cyndy Ramires RN, BSN  Nurse   [phone]

## 2025-01-06 NOTE — PHYSICAL THERAPY NOTE
PHYSICAL THERAPY TREATMENT NOTE - INPATIENT     Room Number: 436/436-A       Presenting Problem: fall onto left side w/resultant closed displaced L femur fx s/p ORIF, WBAT  Co-Morbidities : multiple myeloma, HTN, morbid obesity, SLOAN< OA, DJD, iBS, R TKA, lumbar lami    Problem List  Principal Problem:    Other closed fracture of left femur, unspecified portion of femur, initial encounter ([facility])  Active Problems:    Closed fracture of proximal end of femur ([facility])      PHYSICAL THERAPY ASSESSMENT   Patient demonstrates good  progress this session, goals  updated to reflect patient performance.      Patient is requiring minimal assist as a result of the following impairments: decreased functional strength, decreased endurance/aerobic capacity, pain, impaired standing balance, decreased muscular endurance, and medical status.     Patient continues to function below baseline with bed mobility, transfers, gait, stair negotiation, maintaining seated position, and performing household tasks.  Next session anticipate patient to progress bed mobility, transfers, gait, stair negotiation, maintaining seated position, standing prolonged periods, and performing household tasks.  Physical Therapy will continue to follow patient for duration of hospitalization.    Patient continues to benefit from continued skilled PT services: to facilitate return to prior level of function as patient demonstrates high motivation with excellent tolerance to an intensive therapy program .    PLAN DURING HOSPITALIZATION  Nursing Mobility Recommendation : 1 Assist  PT Device Recommendation: Rolling walker  PT Treatment Plan: Bed mobility;Body mechanics;Endurance;Energy conservation;Patient education;Gait training;Strengthening;Transfer training;Balance training;Stoop training;Stair training  Frequency (Obs): Daily     SUBJECTIVE  I feel like I am doing better each day.     OBJECTIVE  Precautions: Limb alert - left;Bed/chair alarm    WEIGHT BEARING  RESTRICTION  L Lower Extremity: Weight Bearing as Tolerated      PAIN ASSESSMENT   Rating: 3  Location: L hip  Management Techniques: Activity promotion;Body mechanics;Breathing techniques;Relaxation;Repositioning    BALANCE  Static Sitting: Good  Dynamic Sitting: Good  Static Standing: Fair -  Dynamic Standing: Fair -    ACTIVITY TOLERANCE  Pulse: 71  Heart Rate Source: Monitor  Resp: 16  BP: 135/64  BP Location: Right arm  BP Method: Automatic  Patient Position: Sitting     O2 WALK  Oxygen Therapy  SPO2% on Room Air at Rest: 95  SPO2% on Oxygen at Rest: 2  SPO2% Ambulation on Room Air: 94  SPO2% Ambulation on Oxygen: 2    AM-PAC '6-Clicks' INPATIENT SHORT FORM - BASIC MOBILITY  How much difficulty does the patient currently have...  Patient Difficulty: Turning over in bed (including adjusting bedclothes, sheets and blankets)?: A Little   Patient Difficulty: Sitting down on and standing up from a chair with arms (e.g., wheelchair, bedside commode, etc.): A Little   Patient Difficulty: Moving from lying on back to sitting on the side of the bed?: A Little   How much help from another person does the patient currently need...   Help from Another: Moving to and from a bed to a chair (including a wheelchair)?: A Little   Help from Another: Need to walk in hospital room?: A Lot   Help from Another: Climbing 3-5 steps with a railing?: Total     AM-PAC Score:  Raw Score: 15   Approx Degree of Impairment: 57.7%   Standardized Score (AM-PAC Scale): 39.45   CMS Modifier (G-Code): CK    FUNCTIONAL ABILITY STATUS  Functional Mobility/Gait Assessment  Gait Assistance: Minimum assistance  Distance (ft): 30  Assistive Device: Rolling walker  Pattern: L Decreased stance time (decreased step length and antony)  Rolling: minimal assist  Supine to Sit: minimal assist  Sit to Supine: minimal assist  Sit to Stand: minimal assist    Skilled Therapy Provided: Pt ed with bed mobility and transfers with RW with min A to stand from chair.  Pt ed with min A with amb 30' with RW with step to gait and decreased step length. Pt ed with transfers stand to sit and therex reviewed in chair as per HEP x 10 reps. Pt is on track for IR with PT, OT as medical progress allows to regain her maximal PLOF and safety.     The patient's Approx Degree of Impairment: 57.7% has been calculated based on documentation in the [facility] '6 clicks' Inpatient Daily Activity Short Form.  Research supports that patients with this level of impairment may benefit from IP rehab with PT, OT.  Final disposition will be made by interdisciplinary medical team.    THERAPEUTIC EXERCISES  Lower Extremity Ankle pumps  Heel slides  Quad sets     Position Sitting & Standing       Patient End of Session: Up in chair;With  staff;Needs met;Call light within reach;RN aware of session/findings;All patient questions and concerns addressed;Alarm set    CURRENT GOALS   Goals to be met by: 1/10/25  Patient Goal Patient's self-stated goal is: to get better and be independent   Goal #1 Patient is able to demonstrate supine - sit EOB @ level: minimal assistance   Goal #1   Current Status     Goal #2 Patient is able to demonstrate transfers Sit to/from Stand at assistance level: minimal assistance with RW      Goal #2  Current Status  Min A    Goal #3 Patient is able to ambulate 50 feet with RW at assistance level: minimal assistance   Goal #3   Current Status  Min A with RW   Goal #4 Patient will negotiate bed to/from chair transfers with a RW with minimal assistance   Goal #4   Current Status  Min A with RW 30'    Goal #5 Patient verbalizes and/or demonstrates all precautions and safety concerns independently   Goal #5   Current Status  Ongoing   Goal #6 Patient independently performs home exercise program for ROM/strengthening per the instructions provided in preparation for discharge.     Gait Training: 15 minutes  Therapeutic Exercise: 8 minutes

## 2025-01-06 NOTE — PLAN OF CARE
Jackelin is up with walker and one assist to stand/pivot to recliner chair or rolling chair.   Pain managed with tylenol and motrin.    She reports chronic numbness to feet related to chemotherapy.  Otherwise CMS is intact.  Anticipate discharge to acute rehab when stable.    Problem: Patient Centered Care  Goal: Patient preferences are identified and integrated in the patient's plan of care  Description: Interventions:  - What would you like us to know as we care for you? I'm prone to nausea  - Provide timely, complete, and accurate information to patient/family  - Incorporate patient and family knowledge, values, beliefs, and cultural backgrounds into the planning and delivery of care  - Encourage patient/family to participate in care and decision-making at the level they choose  - Honor patient and family perspectives and choices  Outcome: Progressing     Problem: Patient/Family Goals  Goal: Patient/Family Long Term Goal  Description: Patient's Long Term Goal: Discharge home    Interventions:  - Manage pain, increase activity out of bed  - See additional Care Plan goals for specific interventions  Outcome: Progressing  Goal: Patient/Family Short Term Goal  Description: Patient's Short Term Goal: Not be nauseated    Interventions:   - PRN antiemetics as ordered. Dr. Downs aware  - See additional Care Plan goals for specific interventions  Outcome: Progressing     Problem: PAIN - ADULT  Goal: Verbalizes/displays adequate comfort level or patient's stated pain goal  Description: INTERVENTIONS:  - Encourage pt to monitor pain and request assistance  - Assess pain using appropriate pain scale  - Administer analgesics based on type and severity of pain and evaluate response  - Implement non-pharmacological measures as appropriate and evaluate response  - Consider cultural and social influences on pain and pain management  - Manage/alleviate anxiety  - Utilize distraction and/or relaxation techniques  - Monitor for  opioid side effects  - Notify MD/LIP if interventions unsuccessful or patient reports new pain  - Anticipate increased pain with activity and pre-medicate as appropriate  Outcome: Progressing     Problem: RISK FOR INFECTION - ADULT  Goal: Absence of fever/infection during anticipated neutropenic period  Description: INTERVENTIONS  - Monitor WBC  - Administer growth factors as ordered  - Implement neutropenic guidelines  Outcome: Completed     Problem: SAFETY ADULT - FALL  Goal: Free from fall injury  Description: INTERVENTIONS:  - Assess pt frequently for physical needs  - Identify cognitive and physical deficits and behaviors that affect risk of falls.  - [place] fall precautions as indicated by assessment.  - Educate pt/family on patient safety including physical limitations  - Instruct pt to call for assistance with activity based on assessment  - Modify environment to reduce risk of injury  - Provide assistive devices as appropriate  - Consider OT/PT consult to assist with strengthening/mobility  - Encourage toileting schedule  Outcome: Progressing     Problem: DISCHARGE PLANNING  Goal: Discharge to home or other facility with appropriate resources  Description: INTERVENTIONS:  - Identify barriers to discharge w/pt and caregiver  - Include patient/family/discharge partner in discharge planning  - Arrange for needed discharge resources and transportation as appropriate  - Identify discharge learning needs (meds, wound care, etc)  - Arrange for interpreters to assist at discharge as needed  - Consider post-discharge preferences of patient/family/discharge partner  - Complete POLST form as appropriate  - Assess patient's ability to be responsible for managing their own health  - Refer to Case Management Department for coordinating discharge planning if the patient needs post-hospital services based on physician/LIP order or complex needs related to functional status, cognitive ability or social support  system  Outcome: Progressing     Problem: SKIN/TISSUE INTEGRITY - ADULT  Goal: Incision(s), wounds(s) or drain site(s) healing without S/S of infection  Description: INTERVENTIONS:  - Assess and document risk factors for pressure ulcer development  - Assess and document skin integrity  - Assess and document dressing/incision, wound bed, drain sites and surrounding tissue  - Implement wound care per orders  - Initiate isolation precautions as appropriate  - Initiate Pressure Ulcer prevention bundle as indicated  Outcome: Progressing     Problem: MUSCULOSKELETAL - ADULT  Goal: Return mobility to safest level of function  Description: INTERVENTIONS:  - Assess patient stability and activity tolerance for standing, transferring and ambulating w/ or w/o assistive devices  - Assist with transfers and ambulation using safe patient handling equipment as needed  - Ensure adequate protection for wounds/incisions during mobilization  - Obtain PT/OT consults as needed  - Advance activity as appropriate  - Communicate ordered activity level and limitations with patient/family  Outcome: Progressing  Goal: Maintain proper alignment of affected body part  Description: INTERVENTIONS:  - Support and protect limb and body alignment per provider's orders  - Instruct and reinforce with patient and family use of appropriate assistive device and precautions (e.g. spinal or hip dislocation precautions)  Outcome: Progressing

## 2025-01-06 NOTE — DISCHARGE SUMMARY
[facility]  part of [facility]    Discharge Summary    Jackelin Mackey Patient Status:  Inpatient    1955 MRN [id number]   Location [facility] 4W/SW/SE Attending Dedra Chavira MD   Hosp Day # 7 PCP Colby Pandya MD, MD     Date of Admission: 2024   Date of Discharge: 25    Admitting Diagnosis: Other closed fracture of left femur, unspecified portion of femur, initial encounter ([facility]) [S72.8X2A]    Disposition: Transfer to Rehab Facility:      Discharge Diagnosis: .Principal Problem:    Other closed fracture of left femur, unspecified portion of femur, initial encounter ([facility])  Active Problems:    Closed fracture of proximal end of femur ([facility])      Hospital Course:   Reason for Admission: Left comminuted displaced proximal femur fracture.     Discharge Physical Exam:   General: Obese female, NAD. Patient is alert and oriented   HEENT: EOMI PERRLA, atraumatic normocephalic  Cardiac: S1-S2 appreciated  Lungs: Good air entry bilaterally clear to auscultation  Abdomen: Soft nontender nondistended positive bowel sounds  : choi in place  Ext: Peripheral pulses are positive. Left leg in immobilizer   Neuro: No focal deficits noted  Psych: Normal mood        Hospital Course:    Patient is a 69-year-old  female came in after tripping on a carpet and falling, landing on her left hip, with excruciating pain. ED workup with X-ray hip showing acute Left displaced subtrochanteric femur fracture. Orthopedics was consulted and patient underwent Trochanteric fixation nail of left femur on 24 with Dr. Santo Dallas. Tolerated procedure well with no complications. Postoperatively experienced syncopal episode w hospital staff in bathroom, likely from hypovolemia and anemia. Improved with IVFs with no recurrence. Pt has hx of Multiple myeloma and on 1/3/25 experienced Anemia hgb 6.6 and received prbc transfusion. Patient on Daratumumab treatment and it was checked  with hematology acknowledging incompatibility crossmatch.  Hematology service Dr Ellis stated that this is a typical cross reaction with her chemotherapy treatment and that it was safe to give 0 negative blood and monitor repeat hemoglobin 3 to 6 hours posttransfusion. Transfusion given with no concerns. Hemoglobin responded appropriately.    Patient worked with PT who recommended rehab. Patient also experienced urinary retention for which indwelling choi placed after several straight caths. Patient to go to rehab with choi.   Ortho gave instructions for DVT ppx with asa BID. New medications sent, and patient discharged to rehab. Follow up appointments provided.         Consultants         Provider   Role Specialty     Santo Garg MD      Consulting Physician Rehabilitation     Santo Dallas MD      Consulting Physician SURGERY, ORTHOPEDIC          Surgical Procedures       Case IDs Date Procedure Surgeon Location Status    [id number] 12/31/24 Left hip nailing Santo Dallas MD [facility] MAIN OR Comp              Discharge Plan:   Discharge Condition: Stable    Current Discharge Medication List        New Orders    Details   ibuprofen 800 MG Oral Tab Take 1 tablet (800 mg total) by mouth 3 (three) times daily as needed.      !! aspirin 81 MG Oral Tab EC Take 1 tablet (81 mg total) by mouth in the morning and 1 tablet (81 mg total) before bedtime. X 6 weeks      sennosides 17.2 MG Oral Tab Take 1 tablet (17.2 mg total) by mouth nightly for 7 days.      docusate sodium 100 MG Oral Cap Take 100 mg by mouth 2 (two) times daily for 7 days.       !! - Potential duplicate medications found. Please discuss with provider.        Home Meds - Unchanged    Details   gabapentin 300 MG Oral Cap Take 1 capsule (300 mg total) by mouth every evening.      calcium acetate 667 MG Oral Cap Take 1 capsule (667 mg total) by mouth 2 (two) times daily.      Omega-3 Fatty Acids (FISH OIL ADULT GUMMIES OR) Take 1 Piece of gum by mouth  daily.      Potassium Chloride ER 20 MEQ Oral Tab CR Take 20 mEq by mouth 2 (two) times daily.      acyclovir 400 MG Oral Tab Take 1 tablet (400 mg total) by mouth 2 (two) times daily.      Fluticasone Propionate 50 MCG/ACT Nasal Suspension 1 spray by Each Nare route daily as needed for Rhinitis.      cetirizine 10 MG Oral Tab Take 1 tablet (10 mg total) by mouth daily.      acetaminophen 500 MG Oral Tab Take 1 tablet (500 mg total) by mouth every 6 (six) hours as needed for Pain.      Pomalidomide 1 MG Oral Cap Take 1 tablet by mouth daily.      !! aspirin 81 MG Oral Tab EC Take 1 tablet (81 mg total) by mouth daily.      Cyclobenzaprine HCl 5 MG Oral Tab Take 1 tablet (5 mg total) by mouth 3 (three) times daily as needed for Muscle spasms.       !! - Potential duplicate medications found. Please discuss with provider.              Discharge Diet: As tolerated    Discharge Activity: As tolerated    Follow up:      Follow-up Information       Santo Dallas MD. Go in 2 week(s).    Specialty: SURGERY, ORTHOPEDIC  Why: Please follow up with the Orthopedic team  Contact information:  [address]  [address]  [place] [address] [phone]               Colby Pandya MD. Go in 2 day(s).    Specialty: Family Practice  Why: Please see your primary care team for hospital discharge follow up.  Contact information:  [address]  [address]  [place] [id number] [phone]                                 Other Discharge Instructions:         Weight-bearing as tolerated with walker    Keep dressing intact for 1 week, changing only if >50% saturated  Change dressing in 1 week if not previously changed  May shower with water-proof dressing intact    Use over the counter stool softener daily while taking pain medication - Colace 100mg twice a day AND Senokot 17.2mg every night. If no bowel movement in 2 days, obtain Magnesium Citrate and take as directed.  Start 81mg Aspirin tonight with dinner. Continue  81mg twice a day for 6 weeks.    Follow-up in office in 2 weeks               >30 minutes spent on discharge orders, coordination, and planning.     Dedra Chavira MD  1/6/2025

## 2025-01-15 NOTE — PROGRESS NOTES
Physician Clarification    Additional information related to the patient's condition    Post anemia with underlying Multiple myeloma    This note is part of the patient's medical record.

## (undated) DEVICE — 3M(TM) MEDIPORE(TM) H SOFT CLOTH TAPE 2866: Brand: 3M™ MEDIPORE™

## (undated) DEVICE — GAMMEX® PI HYBRID SIZE 7.5, STERILE POWDER-FREE SURGICAL GLOVE, POLYISOPRENE AND NEOPRENE BLEND: Brand: GAMMEX

## (undated) DEVICE — ANTIBACTERIAL UNDYED BRAIDED (POLYGLACTIN 910), SYNTHETIC ABSORBABLE SUTURE: Brand: COATED VICRYL

## (undated) DEVICE — SLIM BODY SKIN STAPLER: Brand: APPOSE ULC

## (undated) DEVICE — GUIDEWIRE ORTH L400MM DIA3.2MM FOR TFN

## (undated) DEVICE — Device: Brand: JELCO

## (undated) DEVICE — 25+ TOTAL PLUS VITRECTORY PAK, BEVELED 10,000 CPM ULTRAVIT PROBE STRAIGHT ENDOILLUMINATOR: Brand: CONSTELLATION, EDGE PLUS, TOTAL PLUS, ULTRAVIT

## (undated) DEVICE — APPLICATOR PREP 26ML CHG 2% ISO ALC 70%

## (undated) DEVICE — DISP ATKINSON RETROBULBAR NDL 25G 10/BOX: Brand: DISP ATKINSON RETROBULBAR NDL 25G 10/BOX

## (undated) DEVICE — BNDG,ELSTC,MATRIX,STRL,6"X5YD,LF,HOOK&LP: Brand: MEDLINE

## (undated) DEVICE — SPONGE 4X4 10PK

## (undated) DEVICE — PAD PERINL PST FOAM DISP FOR AMSCO SURG TBL

## (undated) DEVICE — SOLUTION IRRIG 1000ML 0.9% NACL USP BTL

## (undated) DEVICE — BIT DRL 4.2X145MM 3 FLUT QC CALIB NDL PNT

## (undated) DEVICE — SOLUTION IRR BSS+

## (undated) DEVICE — APPLICATOR COTTON TIP 3 10/PK

## (undated) DEVICE — 12 ML SYRINGE LUER-LOCK TIP: Brand: MONOJECT

## (undated) DEVICE — ROD RMR 2.5X950MM W/ EXTN BALL TIP

## (undated) DEVICE — SUT COAT VCRL 0 27IN CP-1 ABSRB UD 36MM 1/2

## (undated) DEVICE — SOLUTION IRR BTL 250CC NACL

## (undated) DEVICE — PACK CDS HIP PINNING

## (undated) DEVICE — SHEET,DRAPE,53X77,STERILE: Brand: MEDLINE

## (undated) DEVICE — DRESSING ADAPTIC L16XW3IN OIL ADH

## (undated) DEVICE — PACK SRG BIOM FULL DRP STRL

## (undated) DEVICE — BANDAGE,GAUZE,BULKEE II,4.5"X4.1YD,STRL: Brand: MEDLINE

## (undated) DEVICE — BIT DRL LG L500MM 6MM/9MM  CANN STP QC

## (undated) DEVICE — RETINAL: Brand: MEDLINE INDUSTRIES, INC.

## (undated) DEVICE — BIT DRL LG 10X300MM  CANN TAPR QC

## (undated) DEVICE — LIMBHOLDER RSTRNT FOAM ADLT

## (undated) DEVICE — STERILE LATEX POWDER-FREE SURGICAL GLOVESWITH NITRILE COATING: Brand: PROTEXIS

## (undated) DEVICE — SOL H2O 1000ML BTL

## (undated) DEVICE — SPONGE LAP 18X18IN WHT COT 4 PLY FLD STRUNG